# Patient Record
Sex: MALE | Race: WHITE | NOT HISPANIC OR LATINO | Employment: OTHER | URBAN - METROPOLITAN AREA
[De-identification: names, ages, dates, MRNs, and addresses within clinical notes are randomized per-mention and may not be internally consistent; named-entity substitution may affect disease eponyms.]

---

## 2023-03-14 ENCOUNTER — TELEMEDICINE (OUTPATIENT)
Dept: PRIMARY CARE | Facility: CLINIC | Age: 73
End: 2023-03-14
Payer: MEDICARE

## 2023-03-14 VITALS — BODY MASS INDEX: 32.07 KG/M2 | WEIGHT: 224 LBS | TEMPERATURE: 96.3 F | HEIGHT: 70 IN

## 2023-03-14 DIAGNOSIS — U07.1 ACUTE BRONCHITIS DUE TO COVID-19 VIRUS: Primary | ICD-10-CM

## 2023-03-14 DIAGNOSIS — E11.21 DIABETES MELLITUS WITH NEPHROPATHY (MULTI): ICD-10-CM

## 2023-03-14 DIAGNOSIS — J20.8 ACUTE BRONCHITIS DUE TO COVID-19 VIRUS: Primary | ICD-10-CM

## 2023-03-14 PROBLEM — E78.5 HYPERLIPIDEMIA ASSOCIATED WITH TYPE 2 DIABETES MELLITUS (MULTI): Status: ACTIVE | Noted: 2023-03-14

## 2023-03-14 PROBLEM — R79.89 HIGH THYROID STIMULATING HORMONE (TSH) LEVEL: Status: ACTIVE | Noted: 2023-03-14

## 2023-03-14 PROBLEM — E11.59 HYPERTENSION ASSOCIATED WITH DIABETES (MULTI): Status: ACTIVE | Noted: 2023-03-14

## 2023-03-14 PROBLEM — I15.2 HYPERTENSION ASSOCIATED WITH DIABETES (MULTI): Status: ACTIVE | Noted: 2023-03-14

## 2023-03-14 PROBLEM — E11.9 DIABETES MELLITUS, TYPE 2 (MULTI): Status: ACTIVE | Noted: 2023-03-14

## 2023-03-14 PROBLEM — E11.69 HYPERLIPIDEMIA ASSOCIATED WITH TYPE 2 DIABETES MELLITUS (MULTI): Status: ACTIVE | Noted: 2023-03-14

## 2023-03-14 PROBLEM — E66.9 OBESITY, UNSPECIFIED: Status: ACTIVE | Noted: 2023-03-14

## 2023-03-14 PROBLEM — K76.0 FATTY LIVER: Status: ACTIVE | Noted: 2023-03-14

## 2023-03-14 PROBLEM — E87.6 HYPOKALEMIA: Status: ACTIVE | Noted: 2023-03-14

## 2023-03-14 PROCEDURE — 99212 OFFICE O/P EST SF 10 MIN: CPT | Performed by: INTERNAL MEDICINE

## 2023-03-14 RX ORDER — LEVOTHYROXINE SODIUM 25 UG/1
TABLET ORAL
COMMUNITY
End: 2024-05-10 | Stop reason: SDUPTHER

## 2023-03-14 RX ORDER — PAROXETINE HYDROCHLORIDE 20 MG/1
1 TABLET, FILM COATED ORAL DAILY
COMMUNITY
Start: 2014-05-15 | End: 2023-09-05

## 2023-03-14 RX ORDER — ATORVASTATIN CALCIUM 80 MG/1
TABLET, FILM COATED ORAL
COMMUNITY
Start: 2016-04-15 | End: 2024-02-13 | Stop reason: SDUPTHER

## 2023-03-14 RX ORDER — EMPAGLIFLOZIN AND METFORMIN HYDROCHLORIDE 12.5; 1 MG/1; MG/1
1 TABLET ORAL DAILY
COMMUNITY
Start: 2023-01-16 | End: 2023-05-08 | Stop reason: SDUPTHER

## 2023-03-14 RX ORDER — HYDROCHLOROTHIAZIDE 25 MG/1
1 TABLET ORAL DAILY
COMMUNITY
Start: 2023-01-24 | End: 2023-04-20

## 2023-03-14 RX ORDER — AMLODIPINE BESYLATE 10 MG/1
1 TABLET ORAL DAILY
COMMUNITY
Start: 2014-04-07 | End: 2023-09-05

## 2023-03-14 RX ORDER — LOSARTAN POTASSIUM AND HYDROCHLOROTHIAZIDE 25; 100 MG/1; MG/1
1 TABLET ORAL
COMMUNITY
Start: 2023-02-26 | End: 2023-11-17

## 2023-03-14 RX ORDER — BLOOD-GLUCOSE METER
EACH MISCELLANEOUS
COMMUNITY
Start: 2021-10-13 | End: 2023-05-08 | Stop reason: ALTCHOICE

## 2023-03-14 RX ORDER — LANCETS
EACH MISCELLANEOUS
COMMUNITY
Start: 2022-11-08 | End: 2023-05-08 | Stop reason: ALTCHOICE

## 2023-03-14 RX ORDER — HYDROXYZINE HYDROCHLORIDE 25 MG/1
25 TABLET, FILM COATED ORAL EVERY EVENING
COMMUNITY
Start: 2023-02-13 | End: 2023-12-08 | Stop reason: ALTCHOICE

## 2023-03-14 RX ORDER — NIRMATRELVIR AND RITONAVIR 300-100 MG
KIT ORAL
Qty: 30 TABLET | Refills: 0 | Status: SHIPPED | OUTPATIENT
Start: 2023-03-14 | End: 2023-03-19

## 2023-03-14 ASSESSMENT — ENCOUNTER SYMPTOMS
FEVER: 0
WHEEZING: 0
NAUSEA: 0
PALPITATIONS: 0
SORE THROAT: 0
COUGH: 0
FATIGUE: 0
CHILLS: 0
VOMITING: 0
SHORTNESS OF BREATH: 0
CONSTIPATION: 0
DIARRHEA: 0

## 2023-03-14 NOTE — PROGRESS NOTES
Subjective   Patient ID: Mariano Jolley is a 72 y.o. male who presents for Establish Care (Positive covid this morning but his symptoms started last week /Loss of smell /But much better now ).    Assessment/Plan   Problem List Items Addressed This Visit          Respiratory    Acute bronchitis due to COVID-19 virus - Primary     Vitamin C vitamin D zinc melatonin check temperature oxygen at home continue hydration plus paxlovid side effect profile explained advised hold statin therapy          Continue hydration plus antiviral medication problem get worse does not get any better go to the emergency room right away  Source of history: Nurse, Medical personnel, Medical record, Patient.  History limitation: None.    HPI complaining of fever chills headache onset acutely duration 4 to 6 hours progressed acutely aggravating factor change of the weather immunocompromised host with a viral infection wife have similar problem then disease check test at home COVID-positive loss of taste or smell    Negative for high-grade fever    Negative for hypoxia    Negative for DVT PE    Negative for delirium or confusion    Comorbid conditions reviewed discussed with the patient regarding hypertension hyperlipidemia diabetes advised hold statin medication at least for 1 week watch blood sugar very closely  No Known Allergies    Current Outpatient Medications   Medication Sig Dispense Refill    amLODIPine (Norvasc) 10 mg tablet Take 1 tablet (10 mg) by mouth once daily.      atorvastatin (Lipitor) 80 mg tablet Take by mouth.      blood sugar diagnostic (OneTouch Verio test strips) strip pt tests once daily      hydroCHLOROthiazide (HYDRODiuril) 25 mg tablet Take 1 tablet (25 mg) by mouth once daily.      hydrOXYzine HCL (Atarax) 25 mg tablet Take 1 tablet (25 mg) by mouth once daily in the evening.      losartan-hydrochlorothiazide (Hyzaar) 100-25 mg tablet Take 1 tablet by mouth once daily in the morning. Take before meals.       "OneTouch UltraSoft Lancets misc use as directed      PARoxetine (Paxil) 20 mg tablet Take 1 tablet (20 mg) by mouth once daily.      Synjardy 12.5-1,000 mg Take 1 tablet by mouth once daily.      levothyroxine (Synthroid, Levoxyl) 25 mcg tablet TAKE 1/2 TABLET BY MOUTH IN THE MORNING       No current facility-administered medications for this visit.       Objective   Visit Vitals  Temp 35.7 °C (96.3 °F)   Ht 1.778 m (5' 10\")   Wt 102 kg (224 lb)   BMI 32.14 kg/m²   Smoking Status Never   BSA 2.24 m²     Physical Exam.Doxy  This visit was completed via video audio relation to  covid 19 pandemic all issues as below that discuss and address but no physical exam was performed if it was felt that patient should be evaluated in clinic and they have been advised to follow . Patient verbally consented to visit and spent  more than 50% discuss about patient's complaint of problem and plan      Review of Systems   Constitutional:  Negative for chills, fatigue and fever.   HENT:  Negative for congestion, ear pain and sore throat.    Respiratory:  Negative for cough, shortness of breath and wheezing.    Cardiovascular:  Negative for chest pain, palpitations and leg swelling.   Gastrointestinal:  Negative for constipation, diarrhea, nausea and vomiting.       Legacy Encounter on 01/09/2023   Component Date Value Ref Range Status    WBC 01/09/2023 9.7  4.4 - 11.3 x10E9/L Final    nRBC 01/09/2023 0.0  0.0 - 0.0 /100 WBC Final    RBC 01/09/2023 4.81  4.50 - 5.90 x10E12/L Final    Hemoglobin 01/09/2023 14.4  13.5 - 17.5 g/dL Final    Hematocrit 01/09/2023 41.9  41.0 - 52.0 % Final    MCV 01/09/2023 87  80 - 100 fL Final    MCHC 01/09/2023 34.4  32.0 - 36.0 g/dL Final    Platelets 01/09/2023 262  150 - 450 x10E9/L Final    RDW 01/09/2023 13.0  11.5 - 14.5 % Final    Neutrophils % 01/09/2023 66.0  40.0 - 80.0 % Final    Immature Granulocytes %, Automated 01/09/2023 0.5  0.0 - 0.9 % Final    Lymphocytes % 01/09/2023 23.9  13.0 - 44.0 % " Final    Monocytes % 01/09/2023 6.4  2.0 - 10.0 % Final    Eosinophils % 01/09/2023 2.6  0.0 - 6.0 % Final    Basophils % 01/09/2023 0.6  0.0 - 2.0 % Final    Neutrophils Absolute 01/09/2023 6.43 (H)  1.60 - 5.50 x10E9/L Final    Lymphocytes Absolute 01/09/2023 2.33  0.80 - 3.00 x10E9/L Final    Monocytes Absolute 01/09/2023 0.62  0.05 - 0.80 x10E9/L Final    Eosinophils Absolute 01/09/2023 0.25  0.00 - 0.40 x10E9/L Final    Basophils Absolute 01/09/2023 0.06  0.00 - 0.10 x10E9/L Final    ALBUMIN (MG/L) IN URINE 01/09/2023 48.1  Not Established mg/L Final    Albumin/Creatine Ratio 01/09/2023 20.6  0.0 - 30.0 ug/mg crt Final    Creatinine, Urine 01/09/2023 234.0  20.0 - 370.0 mg/dL Final    Color, Urine 01/09/2023 YELLOW  STRAW,YELLOW Final    Appearance, Urine 01/09/2023 HAZY  CLEAR Final    Specific Gravity, Urine 01/09/2023 1.015  1.005 - 1.035 Final    pH, Urine 01/09/2023 5.0  5.0 - 8.0 Final    Protein, Urine 01/09/2023 NEGATIVE  NEGATIVE mg/dL Final    Glucose, Urine 01/09/2023 NEGATIVE  NEGATIVE mg/dL Final    Blood, Urine 01/09/2023 NEGATIVE  NEGATIVE Final    Ketones, Urine 01/09/2023 NEGATIVE  NEGATIVE mg/dL Final    Bilirubin, Urine 01/09/2023 NEGATIVE  NEGATIVE Final    Urobilinogen, Urine 01/09/2023 <2.0  0.0 - 1.9 mg/dL Final    Nitrite, Urine 01/09/2023 NEGATIVE  NEGATIVE Final    Leukocyte Esterase, Urine 01/09/2023 NEGATIVE  NEGATIVE Final    TSH 01/09/2023 3.66  0.44 - 3.98 mIU/L Final    Glucose 01/09/2023 157 (H)  74 - 99 mg/dL Final    Sodium 01/09/2023 140  136 - 145 mmol/L Final    Potassium 01/09/2023 3.3 (L)  3.5 - 5.3 mmol/L Final    Chloride 01/09/2023 97 (L)  98 - 107 mmol/L Final    Bicarbonate 01/09/2023 30  21 - 32 mmol/L Final    Anion Gap 01/09/2023 16  10 - 20 mmol/L Final    Urea Nitrogen 01/09/2023 22  6 - 23 mg/dL Final    Creatinine 01/09/2023 1.37 (H)  0.50 - 1.30 mg/dL Final    GFR MALE 01/09/2023 55 (A)  >90 mL/min/1.73m2 Final    Calcium 01/09/2023 9.9  8.6 - 10.6 mg/dL  Final    Albumin 01/09/2023 4.8  3.4 - 5.0 g/dL Final    Alkaline Phosphatase 01/09/2023 57  33 - 136 U/L Final    Total Protein 01/09/2023 7.5  6.4 - 8.2 g/dL Final    AST 01/09/2023 33  9 - 39 U/L Final    Total Bilirubin 01/09/2023 0.9  0.0 - 1.2 mg/dL Final    ALT (SGPT) 01/09/2023 49  10 - 52 U/L Final    Cholesterol 01/09/2023 131  0 - 199 mg/dL Final    HDL 01/09/2023 35.4 (A)  mg/dL Final    Cholesterol/HDL Ratio 01/09/2023 3.7   Final    LDL 01/09/2023 51  0 - 99 mg/dL Final    VLDL 01/09/2023 45 (H)  0 - 40 mg/dL Final    Triglycerides 01/09/2023 223 (H)  0 - 149 mg/dL Final    Non HDL Cholesterol 01/09/2023 96  mg/dL Final    Hemoglobin A1C 01/09/2023 6.7 (A)  % Final    Estimated Average Glucose 01/09/2023 146  MG/DL Final    PSA 01/09/2023 0.19  0.00 - 4.00 ng/mL Final    Vitamin D, 25-Hydroxy 01/09/2023 35  ng/mL Final       Radiology: Reviewed imaging in powerchart.  No results found.    No family history on file.  Social History     Socioeconomic History    Marital status:      Spouse name: None    Number of children: None    Years of education: None    Highest education level: None   Occupational History    None   Tobacco Use    Smoking status: Never    Smokeless tobacco: Never   Vaping Use    Vaping status: None   Substance and Sexual Activity    Alcohol use: Never    Drug use: Never    Sexual activity: None   Other Topics Concern    None   Social History Narrative    None     Social Determinants of Health     Financial Resource Strain: Not on file   Food Insecurity: Not on file   Transportation Needs: Not on file   Physical Activity: Not on file   Stress: Not on file   Social Connections: Not on file   Intimate Partner Violence: Not on file   Housing Stability: Not on file     Past Medical History:   Diagnosis Date    Encounter for screening for malignant neoplasm of colon 11/17/2016    Screen for colon cancer    Encounter for screening for malignant neoplasm of rectum 11/17/2016     Screening for rectal cancer    Erectile dysfunction due to arterial insufficiency 12/01/2016    Erectile dysfunction due to arterial insufficiency    Essential (primary) hypertension 11/30/2021    Benign essential hypertension    Generalized anxiety disorder 04/04/2022    MARCIA (generalized anxiety disorder)    Impaired fasting glucose 09/27/2021    Fasting hyperglycemia    Obesity, unspecified 04/04/2022    Obesity (BMI 30.0-34.9)    Other hemorrhoids 11/23/2021    Internal hemorrhoid    Pain in right shoulder 10/07/2015    Right shoulder pain    Personal history of diseases of the blood and blood-forming organs and certain disorders involving the immune mechanism 11/17/2016    History of anemia    Personal history of diseases of the skin and subcutaneous tissue 09/16/2015    History of eczema    Personal history of diseases of the skin and subcutaneous tissue 09/16/2015    History of dermatitis    Personal history of diseases of the skin and subcutaneous tissue 04/26/2016    History of allergic contact dermatitis    Personal history of diseases of the skin and subcutaneous tissue     History of contact dermatitis    Personal history of other benign neoplasm 02/26/2021    History of other benign neoplasm    Personal history of other diseases of male genital organs 09/27/2021    History of benign prostatic hyperplasia    Personal history of other diseases of the circulatory system 12/29/2017    History of hypertension    Personal history of other diseases of the digestive system 03/04/2019    History of gastroesophageal reflux (GERD)    Personal history of other diseases of the digestive system 10/05/2020    History of hemorrhoids    Personal history of other diseases of the musculoskeletal system and connective tissue 07/14/2017    History of arthritis    Personal history of other diseases of the musculoskeletal system and connective tissue 03/04/2019    History of ganglion cyst    Personal history of other diseases  of the nervous system and sense organs 07/27/2020    History of otitis media    Personal history of other diseases of the respiratory system 04/24/2014    History of sinusitis    Personal history of other diseases of the respiratory system 11/30/2015    History of sore throat    Personal history of other diseases of the respiratory system 12/11/2014    History of bronchitis    Personal history of other diseases of urinary system 10/05/2020    History of hematuria    Personal history of other endocrine, nutritional and metabolic disease 11/23/2021    History of hyperlipidemia    Personal history of other mental and behavioral disorders 03/04/2019    History of anxiety    Personal history of other mental and behavioral disorders 10/01/2018    History of depression    Personal history of pneumonia (recurrent) 03/04/2019    History of pneumonia    Personal history of pneumonia (recurrent) 12/29/2017    History of community acquired pneumonia    Personal history of urinary (tract) infections 01/18/2022    History of urinary tract infection    Rash and other nonspecific skin eruption 04/04/2022    Skin rash     History reviewed. No pertinent surgical history.    Charting was completed using voice recognition technology and may include unintended errors.

## 2023-03-14 NOTE — ASSESSMENT & PLAN NOTE
Monitor blood sugar because of viral infection and medication increase hydration to prevent ketoacidosis

## 2023-03-14 NOTE — ASSESSMENT & PLAN NOTE
Vitamin C vitamin D zinc melatonin check temperature oxygen at home continue hydration plus paxlovid side effect profile explained advised hold statin therapy

## 2023-03-16 NOTE — ADDENDUM NOTE
Addended by: MARY MENDEZ on: 3/14/2023 03:07 PM     Modules accepted: Orders     Finasteride Male Counseling: Finasteride Counseling:  I discussed with the patient the risks of use of finasteride including but not limited to decreased libido, decreased ejaculate volume, gynecomastia, and depression. Women should not handle medication.  All of the patient's questions and concerns were addressed. Finasteride Counseling:  I discussed with the patient the risks of use of finasteride including but not limited to decreased libido, decreased ejaculate volume, gynecomastia, and depression. Women should not handle medication.  All of the patient's questions and concerns were addressed.

## 2023-04-11 ENCOUNTER — TELEMEDICINE (OUTPATIENT)
Dept: PRIMARY CARE | Facility: CLINIC | Age: 73
End: 2023-04-11
Payer: MEDICARE

## 2023-04-11 VITALS — WEIGHT: 224 LBS | HEIGHT: 70 IN | TEMPERATURE: 97.5 F | BODY MASS INDEX: 32.07 KG/M2

## 2023-04-11 DIAGNOSIS — J20.8 ACUTE BRONCHITIS DUE TO COVID-19 VIRUS: Primary | ICD-10-CM

## 2023-04-11 DIAGNOSIS — E11.618 TYPE 2 DIABETES MELLITUS WITH OTHER DIABETIC ARTHROPATHY, WITHOUT LONG-TERM CURRENT USE OF INSULIN (MULTI): ICD-10-CM

## 2023-04-11 DIAGNOSIS — U07.1 ACUTE BRONCHITIS DUE TO COVID-19 VIRUS: Primary | ICD-10-CM

## 2023-04-11 PROCEDURE — 1159F MED LIST DOCD IN RCRD: CPT | Performed by: INTERNAL MEDICINE

## 2023-04-11 PROCEDURE — 1036F TOBACCO NON-USER: CPT | Performed by: INTERNAL MEDICINE

## 2023-04-11 PROCEDURE — 99212 OFFICE O/P EST SF 10 MIN: CPT | Performed by: INTERNAL MEDICINE

## 2023-04-11 PROCEDURE — 1157F ADVNC CARE PLAN IN RCRD: CPT | Performed by: INTERNAL MEDICINE

## 2023-04-11 RX ORDER — CODEINE PHOSPHATE AND GUAIFENESIN 10; 100 MG/5ML; MG/5ML
10 SOLUTION ORAL EVERY 6 HOURS PRN
Qty: 120 ML | Refills: 0 | Status: SHIPPED | OUTPATIENT
Start: 2023-04-11 | End: 2023-04-16

## 2023-04-11 RX ORDER — AZITHROMYCIN 250 MG/1
TABLET, FILM COATED ORAL
Qty: 6 TABLET | Refills: 0 | Status: SHIPPED | OUTPATIENT
Start: 2023-04-11 | End: 2023-04-16

## 2023-04-11 NOTE — PROGRESS NOTES
Patient ID: Mariano Jolley is a 72 y.o. male who presents for Cough (4 days now /Congestion ).    Assessment/Plan     Problem List Items Addressed This Visit          Respiratory    Acute bronchitis due to COVID-19 virus - Primary     Vitamin C vitamin D zinc melatonin Zithromax Robitussin-DM cough syrup 50 normal twice a day            Endocrine/Metabolic    Diabetes mellitus, type 2 (CMS/HCC)     Diabetes is chronic disease, it does not get cured but it can be controlled, in modern medicine there are variety of measures taken to control DM. Usually we want to preserve beta cell functions. Please understand the disease and how our life style can affect control of glycemia. Diabetes leads to macro and microvascular complications and they could be devastating. It is important to have annual eye check and frequent foot inspection and foot inspection. Kidney dysfunction including dialysis, foot amputations, neuropathy, foot ulcers and accelerated atherosclerotic vascular disease are known complications of uncontrolled DM, pt was educated and explained.              Source of history: Nurse, Medical personnel, Medical record, Patient.  History limitation: None.      HPI  72-year-old patient have diabetes with complication hypertension hyperlipidemia proteinuria hypothyroidism anxiety depression complaining the cough congestion shortness of breath low-grade fever chills onset gradually duration few weeks progressed slowly aggravating factor immunocompromised host allergy infection allergy postnasal drip pharyngitis mild bronchitis given vitamin C vitamin D zinc melatonin Zithromax and Robitussin-DM if no better go to the emergency room watch oxygen temperature blood sugar and blood pressure  No Known Allergies    Medications    Current Outpatient Medications   Medication Sig Dispense Refill    amLODIPine (Norvasc) 10 mg tablet Take 1 tablet (10 mg) by mouth once daily.      atorvastatin (Lipitor) 80 mg tablet Take by  "mouth.      blood sugar diagnostic (OneTouch Verio test strips) strip pt tests once daily      hydroCHLOROthiazide (HYDRODiuril) 25 mg tablet Take 1 tablet (25 mg) by mouth once daily.      hydrOXYzine HCL (Atarax) 25 mg tablet Take 1 tablet (25 mg) by mouth once daily in the evening.      levothyroxine (Synthroid, Levoxyl) 25 mcg tablet TAKE 1/2 TABLET BY MOUTH IN THE MORNING      losartan-hydrochlorothiazide (Hyzaar) 100-25 mg tablet Take 1 tablet by mouth once daily in the morning. Take before meals.      OneTouch UltraSoft Lancets misc use as directed      PARoxetine (Paxil) 20 mg tablet Take 1 tablet (20 mg) by mouth once daily.      Synjardy 12.5-1,000 mg Take 1 tablet by mouth once daily.       No current facility-administered medications for this visit.       Objective   Visit Vitals  Temp 36.4 °C (97.5 °F)   Ht 1.778 m (5' 10\")   Wt 102 kg (224 lb)   BMI 32.14 kg/m²   Smoking Status Never   BSA 2.24 m²       .Doxy  This visit was completed via video audio relation to  covid 19 pandemic all issues as below that discuss and address but no physical exam was performed if it was felt that patient should be evaluated in clinic and they have been advised to follow . Patient verbally consented to visit and spent  more than 50% discuss about patient's complaint of problem and plan      ROS  Constitutional: Denies fevers, chills, fatigue, weight loss/gain  HEENT: Sore throat cardiac: Denies CP, palpitations, edema  Respiratory: Cough congestion phlegm GI: Denies N/V/D, abd pain, constipation, black/bloody stools  : Denies urinary changes, frequency, hematuria, urgency, retention, flank pain  MSK: Denies joint pain, joint swelling, back pain, neck pain, extremity pain  Neuro: Insomnia headache body ache  Immunization History   Administered Date(s) Administered    Influenza, seasonal, injectable 10/10/2022    Pfizer Purple Cap SARS-CoV-2 02/27/2021, 03/27/2021, 10/11/2021    Pfizer Sars-cov-2 Bivalent 30 mcg/0.3 mL " 10/18/2022    Pneumococcal Conjugate PCV 13 07/14/2017    Pneumococcal Polysaccharide PPSV23 09/30/2019    SARS-CoV-2, Unspecified 04/05/2022    Zoster, Recombinant 06/29/2019       Legacy Encounter on 01/09/2023   Component Date Value Ref Range Status    WBC 01/09/2023 9.7  4.4 - 11.3 x10E9/L Final    nRBC 01/09/2023 0.0  0.0 - 0.0 /100 WBC Final    RBC 01/09/2023 4.81  4.50 - 5.90 x10E12/L Final    Hemoglobin 01/09/2023 14.4  13.5 - 17.5 g/dL Final    Hematocrit 01/09/2023 41.9  41.0 - 52.0 % Final    MCV 01/09/2023 87  80 - 100 fL Final    MCHC 01/09/2023 34.4  32.0 - 36.0 g/dL Final    Platelets 01/09/2023 262  150 - 450 x10E9/L Final    RDW 01/09/2023 13.0  11.5 - 14.5 % Final    Neutrophils % 01/09/2023 66.0  40.0 - 80.0 % Final    Immature Granulocytes %, Automated 01/09/2023 0.5  0.0 - 0.9 % Final    Lymphocytes % 01/09/2023 23.9  13.0 - 44.0 % Final    Monocytes % 01/09/2023 6.4  2.0 - 10.0 % Final    Eosinophils % 01/09/2023 2.6  0.0 - 6.0 % Final    Basophils % 01/09/2023 0.6  0.0 - 2.0 % Final    Neutrophils Absolute 01/09/2023 6.43 (H)  1.60 - 5.50 x10E9/L Final    Lymphocytes Absolute 01/09/2023 2.33  0.80 - 3.00 x10E9/L Final    Monocytes Absolute 01/09/2023 0.62  0.05 - 0.80 x10E9/L Final    Eosinophils Absolute 01/09/2023 0.25  0.00 - 0.40 x10E9/L Final    Basophils Absolute 01/09/2023 0.06  0.00 - 0.10 x10E9/L Final    ALBUMIN (MG/L) IN URINE 01/09/2023 48.1  Not Established mg/L Final    Albumin/Creatine Ratio 01/09/2023 20.6  0.0 - 30.0 ug/mg crt Final    Creatinine, Urine 01/09/2023 234.0  20.0 - 370.0 mg/dL Final    Color, Urine 01/09/2023 YELLOW  STRAW,YELLOW Final    Appearance, Urine 01/09/2023 HAZY  CLEAR Final    Specific Gravity, Urine 01/09/2023 1.015  1.005 - 1.035 Final    pH, Urine 01/09/2023 5.0  5.0 - 8.0 Final    Protein, Urine 01/09/2023 NEGATIVE  NEGATIVE mg/dL Final    Glucose, Urine 01/09/2023 NEGATIVE  NEGATIVE mg/dL Final    Blood, Urine 01/09/2023 NEGATIVE  NEGATIVE Final     Ketones, Urine 01/09/2023 NEGATIVE  NEGATIVE mg/dL Final    Bilirubin, Urine 01/09/2023 NEGATIVE  NEGATIVE Final    Urobilinogen, Urine 01/09/2023 <2.0  0.0 - 1.9 mg/dL Final    Nitrite, Urine 01/09/2023 NEGATIVE  NEGATIVE Final    Leukocyte Esterase, Urine 01/09/2023 NEGATIVE  NEGATIVE Final    TSH 01/09/2023 3.66  0.44 - 3.98 mIU/L Final    Glucose 01/09/2023 157 (H)  74 - 99 mg/dL Final    Sodium 01/09/2023 140  136 - 145 mmol/L Final    Potassium 01/09/2023 3.3 (L)  3.5 - 5.3 mmol/L Final    Chloride 01/09/2023 97 (L)  98 - 107 mmol/L Final    Bicarbonate 01/09/2023 30  21 - 32 mmol/L Final    Anion Gap 01/09/2023 16  10 - 20 mmol/L Final    Urea Nitrogen 01/09/2023 22  6 - 23 mg/dL Final    Creatinine 01/09/2023 1.37 (H)  0.50 - 1.30 mg/dL Final    GFR MALE 01/09/2023 55 (A)  >90 mL/min/1.73m2 Final    Calcium 01/09/2023 9.9  8.6 - 10.6 mg/dL Final    Albumin 01/09/2023 4.8  3.4 - 5.0 g/dL Final    Alkaline Phosphatase 01/09/2023 57  33 - 136 U/L Final    Total Protein 01/09/2023 7.5  6.4 - 8.2 g/dL Final    AST 01/09/2023 33  9 - 39 U/L Final    Total Bilirubin 01/09/2023 0.9  0.0 - 1.2 mg/dL Final    ALT (SGPT) 01/09/2023 49  10 - 52 U/L Final    Cholesterol 01/09/2023 131  0 - 199 mg/dL Final    HDL 01/09/2023 35.4 (A)  mg/dL Final    Cholesterol/HDL Ratio 01/09/2023 3.7   Final    LDL 01/09/2023 51  0 - 99 mg/dL Final    VLDL 01/09/2023 45 (H)  0 - 40 mg/dL Final    Triglycerides 01/09/2023 223 (H)  0 - 149 mg/dL Final    Non HDL Cholesterol 01/09/2023 96  mg/dL Final    Hemoglobin A1C 01/09/2023 6.7 (A)  % Final    Estimated Average Glucose 01/09/2023 146  MG/DL Final    PSA 01/09/2023 0.19  0.00 - 4.00 ng/mL Final    Vitamin D, 25-Hydroxy 01/09/2023 35  ng/mL Final       Radiology: Reviewed imaging in powerchart.  No results found.    No family history on file.  Social History     Socioeconomic History    Marital status:      Spouse name: None    Number of children: None    Years of education: None     Highest education level: None   Occupational History    None   Tobacco Use    Smoking status: Never    Smokeless tobacco: Never   Vaping Use    Vaping status: None   Substance and Sexual Activity    Alcohol use: Never    Drug use: Never    Sexual activity: None   Other Topics Concern    None   Social History Narrative    None     Social Determinants of Health     Financial Resource Strain: Not on file   Food Insecurity: Not on file   Transportation Needs: Not on file   Physical Activity: Not on file   Stress: Not on file   Social Connections: Not on file   Intimate Partner Violence: Not on file   Housing Stability: Not on file     Past Medical History:   Diagnosis Date    Encounter for screening for malignant neoplasm of colon 11/17/2016    Screen for colon cancer    Encounter for screening for malignant neoplasm of rectum 11/17/2016    Screening for rectal cancer    Erectile dysfunction due to arterial insufficiency 12/01/2016    Erectile dysfunction due to arterial insufficiency    Essential (primary) hypertension 11/30/2021    Benign essential hypertension    Generalized anxiety disorder 04/04/2022    MARCIA (generalized anxiety disorder)    Impaired fasting glucose 09/27/2021    Fasting hyperglycemia    Obesity, unspecified 04/04/2022    Obesity (BMI 30.0-34.9)    Other hemorrhoids 11/23/2021    Internal hemorrhoid    Pain in right shoulder 10/07/2015    Right shoulder pain    Personal history of diseases of the blood and blood-forming organs and certain disorders involving the immune mechanism 11/17/2016    History of anemia    Personal history of diseases of the skin and subcutaneous tissue 09/16/2015    History of eczema    Personal history of diseases of the skin and subcutaneous tissue 09/16/2015    History of dermatitis    Personal history of diseases of the skin and subcutaneous tissue 04/26/2016    History of allergic contact dermatitis    Personal history of diseases of the skin and subcutaneous tissue      History of contact dermatitis    Personal history of other benign neoplasm 02/26/2021    History of other benign neoplasm    Personal history of other diseases of male genital organs 09/27/2021    History of benign prostatic hyperplasia    Personal history of other diseases of the circulatory system 12/29/2017    History of hypertension    Personal history of other diseases of the digestive system 03/04/2019    History of gastroesophageal reflux (GERD)    Personal history of other diseases of the digestive system 10/05/2020    History of hemorrhoids    Personal history of other diseases of the musculoskeletal system and connective tissue 07/14/2017    History of arthritis    Personal history of other diseases of the musculoskeletal system and connective tissue 03/04/2019    History of ganglion cyst    Personal history of other diseases of the nervous system and sense organs 07/27/2020    History of otitis media    Personal history of other diseases of the respiratory system 04/24/2014    History of sinusitis    Personal history of other diseases of the respiratory system 11/30/2015    History of sore throat    Personal history of other diseases of the respiratory system 12/11/2014    History of bronchitis    Personal history of other diseases of urinary system 10/05/2020    History of hematuria    Personal history of other endocrine, nutritional and metabolic disease 11/23/2021    History of hyperlipidemia    Personal history of other mental and behavioral disorders 03/04/2019    History of anxiety    Personal history of other mental and behavioral disorders 10/01/2018    History of depression    Personal history of pneumonia (recurrent) 03/04/2019    History of pneumonia    Personal history of pneumonia (recurrent) 12/29/2017    History of community acquired pneumonia    Personal history of urinary (tract) infections 01/18/2022    History of urinary tract infection    Rash and other nonspecific skin eruption  04/04/2022    Skin rash     History reviewed. No pertinent surgical history.  * Cannot find OR log *    Charting was completed using voice recognition technology and may include unintended errors.

## 2023-04-20 DIAGNOSIS — I15.2 HYPERTENSION SECONDARY TO ENDOCRINE DISORDERS: ICD-10-CM

## 2023-04-20 DIAGNOSIS — E11.59 TYPE 2 DIABETES MELLITUS WITH OTHER CIRCULATORY COMPLICATIONS (MULTI): ICD-10-CM

## 2023-04-20 RX ORDER — HYDROCHLOROTHIAZIDE 25 MG/1
TABLET ORAL
Qty: 90 TABLET | Refills: 3 | Status: SHIPPED | OUTPATIENT
Start: 2023-04-20 | End: 2023-05-08 | Stop reason: ALTCHOICE

## 2023-05-08 ENCOUNTER — OFFICE VISIT (OUTPATIENT)
Dept: PRIMARY CARE | Facility: CLINIC | Age: 73
End: 2023-05-08
Payer: MEDICARE

## 2023-05-08 ENCOUNTER — LAB (OUTPATIENT)
Dept: LAB | Facility: LAB | Age: 73
End: 2023-05-08
Payer: MEDICARE

## 2023-05-08 VITALS
HEIGHT: 70 IN | HEART RATE: 68 BPM | DIASTOLIC BLOOD PRESSURE: 66 MMHG | TEMPERATURE: 97.3 F | OXYGEN SATURATION: 97 % | SYSTOLIC BLOOD PRESSURE: 106 MMHG | BODY MASS INDEX: 31.9 KG/M2 | WEIGHT: 222.8 LBS

## 2023-05-08 DIAGNOSIS — E83.42 HYPOMAGNESEMIA: ICD-10-CM

## 2023-05-08 DIAGNOSIS — E11.21 DIABETES MELLITUS WITH NEPHROPATHY (MULTI): ICD-10-CM

## 2023-05-08 DIAGNOSIS — N18.31 STAGE 3A CHRONIC KIDNEY DISEASE (MULTI): ICD-10-CM

## 2023-05-08 DIAGNOSIS — E78.5 HYPERLIPIDEMIA ASSOCIATED WITH TYPE 2 DIABETES MELLITUS (MULTI): ICD-10-CM

## 2023-05-08 DIAGNOSIS — I15.2 HYPERTENSION ASSOCIATED WITH DIABETES (MULTI): ICD-10-CM

## 2023-05-08 DIAGNOSIS — E11.618 TYPE 2 DIABETES MELLITUS WITH OTHER DIABETIC ARTHROPATHY, WITHOUT LONG-TERM CURRENT USE OF INSULIN (MULTI): Primary | ICD-10-CM

## 2023-05-08 DIAGNOSIS — E11.69 HYPERLIPIDEMIA ASSOCIATED WITH TYPE 2 DIABETES MELLITUS (MULTI): ICD-10-CM

## 2023-05-08 DIAGNOSIS — E11.59 HYPERTENSION ASSOCIATED WITH DIABETES (MULTI): ICD-10-CM

## 2023-05-08 DIAGNOSIS — E11.618 TYPE 2 DIABETES MELLITUS WITH OTHER DIABETIC ARTHROPATHY, WITHOUT LONG-TERM CURRENT USE OF INSULIN (MULTI): ICD-10-CM

## 2023-05-08 DIAGNOSIS — E66.09 CLASS 1 OBESITY DUE TO EXCESS CALORIES WITHOUT SERIOUS COMORBIDITY WITH BODY MASS INDEX (BMI) OF 31.0 TO 31.9 IN ADULT: ICD-10-CM

## 2023-05-08 PROBLEM — J20.8 ACUTE BRONCHITIS DUE TO COVID-19 VIRUS: Status: RESOLVED | Noted: 2023-03-14 | Resolved: 2023-05-08

## 2023-05-08 PROBLEM — E11.9 DIABETES MELLITUS, TYPE 2 (MULTI): Status: RESOLVED | Noted: 2023-03-14 | Resolved: 2023-05-08

## 2023-05-08 PROBLEM — K63.5 POLYP OF COLON: Status: RESOLVED | Noted: 2023-05-08 | Resolved: 2023-05-08

## 2023-05-08 PROBLEM — K76.0 FATTY LIVER: Status: RESOLVED | Noted: 2023-03-14 | Resolved: 2023-05-08

## 2023-05-08 PROBLEM — F41.9 ANXIETY: Status: ACTIVE | Noted: 2023-05-08

## 2023-05-08 PROBLEM — U07.1 ACUTE BRONCHITIS DUE TO COVID-19 VIRUS: Status: RESOLVED | Noted: 2023-03-14 | Resolved: 2023-05-08

## 2023-05-08 PROBLEM — K63.5 POLYP OF COLON: Status: ACTIVE | Noted: 2023-05-08

## 2023-05-08 LAB
ALANINE AMINOTRANSFERASE (SGPT) (U/L) IN SER/PLAS: 33 U/L (ref 10–52)
ALBUMIN (G/DL) IN SER/PLAS: 5 G/DL (ref 3.4–5)
ALBUMIN (MG/L) IN URINE: 15.9 MG/L
ALBUMIN/CREATININE (UG/MG) IN URINE: 9.2 UG/MG CRT (ref 0–30)
ALKALINE PHOSPHATASE (U/L) IN SER/PLAS: 47 U/L (ref 33–136)
ANION GAP IN SER/PLAS: 14 MMOL/L (ref 10–20)
ASPARTATE AMINOTRANSFERASE (SGOT) (U/L) IN SER/PLAS: 30 U/L (ref 9–39)
BILIRUBIN TOTAL (MG/DL) IN SER/PLAS: 0.9 MG/DL (ref 0–1.2)
CALCIUM (MG/DL) IN SER/PLAS: 10.1 MG/DL (ref 8.6–10.6)
CARBON DIOXIDE, TOTAL (MMOL/L) IN SER/PLAS: 31 MMOL/L (ref 21–32)
CHLORIDE (MMOL/L) IN SER/PLAS: 101 MMOL/L (ref 98–107)
CREATININE (MG/DL) IN SER/PLAS: 1.14 MG/DL (ref 0.5–1.3)
CREATININE (MG/DL) IN URINE: 172 MG/DL (ref 20–370)
ESTIMATED AVERAGE GLUCOSE FOR HBA1C: 128 MG/DL
GFR MALE: 68 ML/MIN/1.73M2
GLUCOSE (MG/DL) IN SER/PLAS: 134 MG/DL (ref 74–99)
HEMOGLOBIN A1C/HEMOGLOBIN TOTAL IN BLOOD: 6.1 %
MAGNESIUM (MG/DL) IN SER/PLAS: 2.1 MG/DL (ref 1.6–2.4)
POTASSIUM (MMOL/L) IN SER/PLAS: 3.6 MMOL/L (ref 3.5–5.3)
PROTEIN TOTAL: 7.6 G/DL (ref 6.4–8.2)
SODIUM (MMOL/L) IN SER/PLAS: 142 MMOL/L (ref 136–145)
UREA NITROGEN (MG/DL) IN SER/PLAS: 15 MG/DL (ref 6–23)

## 2023-05-08 PROCEDURE — 3044F HG A1C LEVEL LT 7.0%: CPT | Performed by: INTERNAL MEDICINE

## 2023-05-08 PROCEDURE — 83735 ASSAY OF MAGNESIUM: CPT

## 2023-05-08 PROCEDURE — 36415 COLL VENOUS BLD VENIPUNCTURE: CPT

## 2023-05-08 PROCEDURE — 99214 OFFICE O/P EST MOD 30 MIN: CPT | Performed by: INTERNAL MEDICINE

## 2023-05-08 PROCEDURE — 3078F DIAST BP <80 MM HG: CPT | Performed by: INTERNAL MEDICINE

## 2023-05-08 PROCEDURE — 1159F MED LIST DOCD IN RCRD: CPT | Performed by: INTERNAL MEDICINE

## 2023-05-08 PROCEDURE — 82043 UR ALBUMIN QUANTITATIVE: CPT

## 2023-05-08 PROCEDURE — 1160F RVW MEDS BY RX/DR IN RCRD: CPT | Performed by: INTERNAL MEDICINE

## 2023-05-08 PROCEDURE — 80053 COMPREHEN METABOLIC PANEL: CPT

## 2023-05-08 PROCEDURE — 1036F TOBACCO NON-USER: CPT | Performed by: INTERNAL MEDICINE

## 2023-05-08 PROCEDURE — 3008F BODY MASS INDEX DOCD: CPT | Performed by: INTERNAL MEDICINE

## 2023-05-08 PROCEDURE — 82570 ASSAY OF URINE CREATININE: CPT

## 2023-05-08 PROCEDURE — 3074F SYST BP LT 130 MM HG: CPT | Performed by: INTERNAL MEDICINE

## 2023-05-08 PROCEDURE — 1157F ADVNC CARE PLAN IN RCRD: CPT | Performed by: INTERNAL MEDICINE

## 2023-05-08 PROCEDURE — 83036 HEMOGLOBIN GLYCOSYLATED A1C: CPT

## 2023-05-08 RX ORDER — EMPAGLIFLOZIN AND METFORMIN HYDROCHLORIDE 12.5; 1 MG/1; MG/1
1 TABLET ORAL DAILY
Qty: 90 TABLET | Refills: 3 | Status: SHIPPED | OUTPATIENT
Start: 2023-05-08 | End: 2024-02-08

## 2023-05-08 NOTE — PROGRESS NOTES
Patient ID: Mariano Jolley is a 72 y.o. male who presents for Follow-up.    Assessment/Plan     Problem List Items Addressed This Visit          Circulatory    Hypertension associated with diabetes (CMS/HCC)     Patients BP readings reviewed and addressed, as we age our arteries turn stiffer and less elastic. Restricting salt consumption and staying physically fit with regular exercise regimen is the only way to keep our vasculature less tonic. Studies have shown that keeping ideal body wt, exercise routine about 140 to 150 minutes a week, eating variety of plant based diet and drinking plentiful water are quite helpful. Monitor BP twice or once a week at home and bring log to be reviewed by me. Uncontrolled BP has long term consequences including heart failure, myocardial infarction, accelerated atherosclerosis and kidney dysfunction. Therapy reviewed and explained.              Endocrine/Metabolic    Diabetes mellitus with nephropathy (CMS/HCC)     Diabetes is chronic disease, it does not get cured but it can be controlled, in modern medicine there are variety of measures taken to control DM. Usually we want to preserve beta cell functions. Please understand the disease and how our life style can affect control of glycemia. Diabetes leads to macro and microvascular complications and they could be devastating. It is important to have annual eye check and frequent foot inspection and foot inspection. Kidney dysfunction including dialysis, foot amputations, neuropathy, foot ulcers and accelerated atherosclerotic vascular disease are known complications of uncontrolled DM, pt was educated and explained.          Hyperlipidemia associated with type 2 diabetes mellitus (CMS/HCC)    Obesity, unspecified     I spent <15 minutes face to face with individual providing recommendations for nutrition choices and exercise plan to help achieve weight reduction goals. Obesity is systemic disorder and it can bring devastating  morbidities in furture. It is a matter of calorie gain and loss, keeping bodybank in negative calorie balance mode is the way to sustain weight loss.Diet has a big role in reducing excess body wt. Scheduled and well planned meals and food intake with watchfulness and understanding of calorie portion and distribution is key to understand. Bariatric surgery is another option if sustained wt loss is not achieved and faced with one or more comorbidities with morbid obesity. Weigh yourself twice a week to understand and follow wt loss goals.           RESOLVED: Diabetes mellitus, type 2 (CMS/[facility]) - Primary    Relevant Medications    empagliflozin-metformin (Synjardy) 12.5-1,000 mg    Other Relevant Orders    Albumin , Urine Random    Comprehensive Metabolic Panel    Hemoglobin A1C    Magnesium     Other Visit Diagnoses       Hypomagnesemia        Relevant Orders    Magnesium          Patient was evaluated today, problem list was reviewed, problems and concerns addressed, Rx list reviewed and updated, lab and tests were noted and reviewed. Life style changes were discussed, always it works better if we eat plant based diet and plenty of fibres and roughage. Consume adequate amount of water and avoid alcohol, light to moderate physical activities and stress reduction are always beneficial for ongoing physical well being. Do not forget to have 6 to 7 hours of sleep regularly and avoid late night ilya screen exposure.   Source of history: Nurse, Medical personnel, Medical record, Patient.  History limitation: None.      HPI  78-year-old patient who diabetes with complication retinopathy neuropathy nephropathy associated with hypertension hyperlipidemia hypothyroidism obesity anxiety depression complaining arthralgia myalgia fatigue tired tingling numbness lower extremity    Negative for fall    Negative for suicide    Negative for hypoxia hypoglycemia or COVID-19  No Known Allergies    Medications    Current Outpatient  "Medications   Medication Sig Dispense Refill    amLODIPine (Norvasc) 10 mg tablet Take 1 tablet (10 mg) by mouth once daily.      atorvastatin (Lipitor) 80 mg tablet Take by mouth.      hydrOXYzine HCL (Atarax) 25 mg tablet Take 1 tablet (25 mg) by mouth once daily in the evening.      levothyroxine (Synthroid, Levoxyl) 25 mcg tablet TAKE 1/2 TABLET BY MOUTH IN THE MORNING      losartan-hydrochlorothiazide (Hyzaar) 100-25 mg tablet Take 1 tablet by mouth once daily in the morning. Take before meals.      PARoxetine (Paxil) 20 mg tablet Take 1 tablet (20 mg) by mouth once daily.      empagliflozin-metformin (Synjardy) 12.5-1,000 mg Take 1 tablet by mouth once daily. 90 tablet 3     No current facility-administered medications for this visit.       Objective   Visit Vitals  /66 (BP Location: Right arm, Patient Position: Sitting, BP Cuff Size: Large adult)   Pulse 68   Temp 36.3 °C (97.3 °F)   Ht 1.778 m (5' 10\")   Wt 101 kg (222 lb 12.8 oz)   SpO2 97%   BMI 31.97 kg/m²   Smoking Status Never   BSA 2.23 m²       PHYSICAL EXAM  General: NAD. NCAT. Aox3 obesity  HEENT: PERRLA. EOMI. MMM. Nares patent bl.  Cardiovascular: RRR. No MRG. S1/S2 wnl.  Heart murmur  Respiratory: CTABL. No acute respiratory distress.   GI: Soft, NT abdomen. BS present x 4.   : No CVAT BL  MSK: ROM x 4. CTLS non-tender.  Arthritis  Extremities: No edema. Cap refill < 2 sec.   Skin: No rashes or bruises.   Neuro: Aox3. Cranial Nerves grossly intact. Motor/sensory wnl.   Psych: Mood wnl.      Physical Exam  Cardiovascular:      Pulses:           Dorsalis pedis pulses are 2+ on the right side and 2+ on the left side.        Posterior tibial pulses are 2+ on the right side and 2+ on the left side.   Musculoskeletal:      Right foot: No deformity.      Left foot: No deformity.   Feet:      Right foot:      Protective Sensation: 5 sites tested.        Skin integrity: Skin integrity normal.      Toenail Condition: Right toenails are normal.      " Left foot:      Protective Sensation: 5 sites tested.        Skin integrity: Skin integrity normal.      Toenail Condition: Left toenails are normal.         ROS  Constitutional: Denies fevers, chills, fatigue, weight loss/gain  HEENT: Denies HA, vision changes, hearing loss, sore throat  Cardiac: Denies CP, palpitations, edema  Respiratory: Denies SOB, cough, pleuritic chest pain, PND, orthopnea  GI: Denies N/V/D, abd pain, constipation, black/bloody stools  : Denies urinary changes, frequency, hematuria, urgency, retention, flank pain  MSK: Denies joint pain, joint swelling, back pain, neck pain, extremity pain  Neuro: Denies numbness, weakness, tingling    Immunization History   Administered Date(s) Administered    Influenza, seasonal, injectable 10/26/2016, 10/10/2022    Influenza, seasonal, injectable, preservative free 09/16/2015    Pfizer Gray Cap SARS-CoV-2 04/05/2022    Pfizer Purple Cap SARS-CoV-2 02/27/2021, 03/27/2021, 10/11/2021    Pfizer Sars-cov-2 Bivalent 30 mcg/0.3 mL 10/18/2022    Pneumococcal Conjugate PCV 13 07/14/2017    Pneumococcal Polysaccharide PPSV23 09/30/2019    SARS-CoV-2, Unspecified 04/05/2022    Zoster, Recombinant 06/29/2019       Legacy Encounter on 01/09/2023   Component Date Value Ref Range Status    WBC 01/09/2023 9.7  4.4 - 11.3 x10E9/L Final    nRBC 01/09/2023 0.0  0.0 - 0.0 /100 WBC Final    RBC 01/09/2023 4.81  4.50 - 5.90 x10E12/L Final    Hemoglobin 01/09/2023 14.4  13.5 - 17.5 g/dL Final    Hematocrit 01/09/2023 41.9  41.0 - 52.0 % Final    MCV 01/09/2023 87  80 - 100 fL Final    MCHC 01/09/2023 34.4  32.0 - 36.0 g/dL Final    Platelets 01/09/2023 262  150 - 450 x10E9/L Final    RDW 01/09/2023 13.0  11.5 - 14.5 % Final    Neutrophils % 01/09/2023 66.0  40.0 - 80.0 % Final    Immature Granulocytes %, Automated 01/09/2023 0.5  0.0 - 0.9 % Final    Lymphocytes % 01/09/2023 23.9  13.0 - 44.0 % Final    Monocytes % 01/09/2023 6.4  2.0 - 10.0 % Final    Eosinophils %  01/09/2023 2.6  0.0 - 6.0 % Final    Basophils % 01/09/2023 0.6  0.0 - 2.0 % Final    Neutrophils Absolute 01/09/2023 6.43 (H)  1.60 - 5.50 x10E9/L Final    Lymphocytes Absolute 01/09/2023 2.33  0.80 - 3.00 x10E9/L Final    Monocytes Absolute 01/09/2023 0.62  0.05 - 0.80 x10E9/L Final    Eosinophils Absolute 01/09/2023 0.25  0.00 - 0.40 x10E9/L Final    Basophils Absolute 01/09/2023 0.06  0.00 - 0.10 x10E9/L Final    ALBUMIN (MG/L) IN URINE 01/09/2023 48.1  Not Established mg/L Final    Albumin/Creatine Ratio 01/09/2023 20.6  0.0 - 30.0 ug/mg crt Final    Creatinine, Urine 01/09/2023 234.0  20.0 - 370.0 mg/dL Final    Color, Urine 01/09/2023 YELLOW  STRAW,YELLOW Final    Appearance, Urine 01/09/2023 HAZY  CLEAR Final    Specific Gravity, Urine 01/09/2023 1.015  1.005 - 1.035 Final    pH, Urine 01/09/2023 5.0  5.0 - 8.0 Final    Protein, Urine 01/09/2023 NEGATIVE  NEGATIVE mg/dL Final    Glucose, Urine 01/09/2023 NEGATIVE  NEGATIVE mg/dL Final    Blood, Urine 01/09/2023 NEGATIVE  NEGATIVE Final    Ketones, Urine 01/09/2023 NEGATIVE  NEGATIVE mg/dL Final    Bilirubin, Urine 01/09/2023 NEGATIVE  NEGATIVE Final    Urobilinogen, Urine 01/09/2023 <2.0  0.0 - 1.9 mg/dL Final    Nitrite, Urine 01/09/2023 NEGATIVE  NEGATIVE Final    Leukocyte Esterase, Urine 01/09/2023 NEGATIVE  NEGATIVE Final    TSH 01/09/2023 3.66  0.44 - 3.98 mIU/L Final    Glucose 01/09/2023 157 (H)  74 - 99 mg/dL Final    Sodium 01/09/2023 140  136 - 145 mmol/L Final    Potassium 01/09/2023 3.3 (L)  3.5 - 5.3 mmol/L Final    Chloride 01/09/2023 97 (L)  98 - 107 mmol/L Final    Bicarbonate 01/09/2023 30  21 - 32 mmol/L Final    Anion Gap 01/09/2023 16  10 - 20 mmol/L Final    Urea Nitrogen 01/09/2023 22  6 - 23 mg/dL Final    Creatinine 01/09/2023 1.37 (H)  0.50 - 1.30 mg/dL Final    GFR MALE 01/09/2023 55 (A)  >90 mL/min/1.73m2 Final    Calcium 01/09/2023 9.9  8.6 - 10.6 mg/dL Final    Albumin 01/09/2023 4.8  3.4 - 5.0 g/dL Final    Alkaline Phosphatase  01/09/2023 57  33 - 136 U/L Final    Total Protein 01/09/2023 7.5  6.4 - 8.2 g/dL Final    AST 01/09/2023 33  9 - 39 U/L Final    Total Bilirubin 01/09/2023 0.9  0.0 - 1.2 mg/dL Final    ALT (SGPT) 01/09/2023 49  10 - 52 U/L Final    Cholesterol 01/09/2023 131  0 - 199 mg/dL Final    HDL 01/09/2023 35.4 (A)  mg/dL Final    Cholesterol/HDL Ratio 01/09/2023 3.7   Final    LDL 01/09/2023 51  0 - 99 mg/dL Final    VLDL 01/09/2023 45 (H)  0 - 40 mg/dL Final    Triglycerides 01/09/2023 223 (H)  0 - 149 mg/dL Final    Non HDL Cholesterol 01/09/2023 96  mg/dL Final    Hemoglobin A1C 01/09/2023 6.7 (A)  % Final    Estimated Average Glucose 01/09/2023 146  MG/DL Final    PSA 01/09/2023 0.19  0.00 - 4.00 ng/mL Final    Vitamin D, 25-Hydroxy 01/09/2023 35  ng/mL Final       Radiology: Reviewed imaging in powerchart.  No results found.    Family History   Problem Relation Name Age of Onset    Other (Other) Mother      Hypertension Mother      Diabetes Mother      Lung cancer Mother      Hypertension Father      Hyperlipidemia Father      Lung cancer Father      Other (bladder cancer) Father      Other (smoker) Father       Social History     Socioeconomic History    Marital status:      Spouse name: None    Number of children: None    Years of education: None    Highest education level: None   Occupational History    None   Tobacco Use    Smoking status: Never    Smokeless tobacco: Never   Vaping Use    Vaping status: None   Substance and Sexual Activity    Alcohol use: Never    Drug use: Never    Sexual activity: None   Other Topics Concern    None   Social History Narrative    None     Social Determinants of Health     Financial Resource Strain: Not on file   Food Insecurity: Not on file   Transportation Needs: Not on file   Physical Activity: Not on file   Stress: Not on file   Social Connections: Not on file   Intimate Partner Violence: Not on file   Housing Stability: Not on file     Past Medical History:   Diagnosis  Date    Encounter for screening for malignant neoplasm of colon 11/17/2016    Screen for colon cancer    Encounter for screening for malignant neoplasm of rectum 11/17/2016    Screening for rectal cancer    Erectile dysfunction due to arterial insufficiency 12/01/2016    Erectile dysfunction due to arterial insufficiency    Essential (primary) hypertension 11/30/2021    Benign essential hypertension    Generalized anxiety disorder 04/04/2022    MARCIA (generalized anxiety disorder)    Impaired fasting glucose 09/27/2021    Fasting hyperglycemia    Obesity, unspecified 04/04/2022    Obesity (BMI 30.0-34.9)    Other hemorrhoids 11/23/2021    Internal hemorrhoid    Pain in right shoulder 10/07/2015    Right shoulder pain    Personal history of diseases of the blood and blood-forming organs and certain disorders involving the immune mechanism 11/17/2016    History of anemia    Personal history of diseases of the skin and subcutaneous tissue 09/16/2015    History of eczema    Personal history of diseases of the skin and subcutaneous tissue 09/16/2015    History of dermatitis    Personal history of diseases of the skin and subcutaneous tissue 04/26/2016    History of allergic contact dermatitis    Personal history of diseases of the skin and subcutaneous tissue     History of contact dermatitis    Personal history of other benign neoplasm 02/26/2021    History of other benign neoplasm    Personal history of other diseases of male genital organs 09/27/2021    History of benign prostatic hyperplasia    Personal history of other diseases of the circulatory system 12/29/2017    History of hypertension    Personal history of other diseases of the digestive system 03/04/2019    History of gastroesophageal reflux (GERD)    Personal history of other diseases of the digestive system 10/05/2020    History of hemorrhoids    Personal history of other diseases of the musculoskeletal system and connective tissue 07/14/2017    History of  arthritis    Personal history of other diseases of the musculoskeletal system and connective tissue 03/04/2019    History of ganglion cyst    Personal history of other diseases of the nervous system and sense organs 07/27/2020    History of otitis media    Personal history of other diseases of the respiratory system 04/24/2014    History of sinusitis    Personal history of other diseases of the respiratory system 11/30/2015    History of sore throat    Personal history of other diseases of the respiratory system 12/11/2014    History of bronchitis    Personal history of other diseases of urinary system 10/05/2020    History of hematuria    Personal history of other endocrine, nutritional and metabolic disease 11/23/2021    History of hyperlipidemia    Personal history of other mental and behavioral disorders 03/04/2019    History of anxiety    Personal history of other mental and behavioral disorders 10/01/2018    History of depression    Personal history of pneumonia (recurrent) 03/04/2019    History of pneumonia    Personal history of pneumonia (recurrent) 12/29/2017    History of community acquired pneumonia    Personal history of urinary (tract) infections 01/18/2022    History of urinary tract infection    Rash and other nonspecific skin eruption 04/04/2022    Skin rash     Past Surgical History:   Procedure Laterality Date    HEMORRHOID SURGERY       * Cannot find OR log *    Charting was completed using voice recognition technology and may include unintended errors.

## 2023-05-09 ENCOUNTER — TELEPHONE (OUTPATIENT)
Dept: PRIMARY CARE | Facility: CLINIC | Age: 73
End: 2023-05-09
Payer: MEDICARE

## 2023-05-09 NOTE — TELEPHONE ENCOUNTER
----- Message from Cyndie Ibarra MD sent at 5/9/2023  8:52 AM EDT -----  Hyperglycemia improving kidney function improving hemoglobin A1c 6.1 follow-up 3 months

## 2023-07-13 ENCOUNTER — OFFICE VISIT (OUTPATIENT)
Dept: PRIMARY CARE | Facility: CLINIC | Age: 73
End: 2023-07-13
Payer: MEDICARE

## 2023-07-13 VITALS
HEART RATE: 79 BPM | WEIGHT: 222.6 LBS | OXYGEN SATURATION: 97 % | TEMPERATURE: 97.1 F | HEIGHT: 70 IN | DIASTOLIC BLOOD PRESSURE: 70 MMHG | BODY MASS INDEX: 31.87 KG/M2 | SYSTOLIC BLOOD PRESSURE: 115 MMHG

## 2023-07-13 DIAGNOSIS — R07.81 RIB PAIN ON RIGHT SIDE: Primary | ICD-10-CM

## 2023-07-13 DIAGNOSIS — M54.40 ACUTE RIGHT-SIDED LOW BACK PAIN WITH SCIATICA, SCIATICA LATERALITY UNSPECIFIED: ICD-10-CM

## 2023-07-13 DIAGNOSIS — E11.21 DIABETES MELLITUS WITH NEPHROPATHY (MULTI): ICD-10-CM

## 2023-07-13 DIAGNOSIS — N18.31 STAGE 3A CHRONIC KIDNEY DISEASE (MULTI): ICD-10-CM

## 2023-07-13 PROCEDURE — 96372 THER/PROPH/DIAG INJ SC/IM: CPT | Performed by: INTERNAL MEDICINE

## 2023-07-13 PROCEDURE — 3008F BODY MASS INDEX DOCD: CPT | Performed by: INTERNAL MEDICINE

## 2023-07-13 PROCEDURE — 1157F ADVNC CARE PLAN IN RCRD: CPT | Performed by: INTERNAL MEDICINE

## 2023-07-13 PROCEDURE — 1036F TOBACCO NON-USER: CPT | Performed by: INTERNAL MEDICINE

## 2023-07-13 PROCEDURE — 3044F HG A1C LEVEL LT 7.0%: CPT | Performed by: INTERNAL MEDICINE

## 2023-07-13 PROCEDURE — 1159F MED LIST DOCD IN RCRD: CPT | Performed by: INTERNAL MEDICINE

## 2023-07-13 PROCEDURE — 3078F DIAST BP <80 MM HG: CPT | Performed by: INTERNAL MEDICINE

## 2023-07-13 PROCEDURE — 1160F RVW MEDS BY RX/DR IN RCRD: CPT | Performed by: INTERNAL MEDICINE

## 2023-07-13 PROCEDURE — 99214 OFFICE O/P EST MOD 30 MIN: CPT | Performed by: INTERNAL MEDICINE

## 2023-07-13 PROCEDURE — 3074F SYST BP LT 130 MM HG: CPT | Performed by: INTERNAL MEDICINE

## 2023-07-13 RX ORDER — TRIAMCINOLONE ACETONIDE 40 MG/ML
40 INJECTION, SUSPENSION INTRA-ARTICULAR; INTRAMUSCULAR ONCE
Status: COMPLETED | OUTPATIENT
Start: 2023-07-13 | End: 2023-07-13

## 2023-07-13 RX ORDER — CYCLOBENZAPRINE HCL 10 MG
10 TABLET ORAL EVERY 8 HOURS PRN
Qty: 14 TABLET | Refills: 0 | Status: SHIPPED | OUTPATIENT
Start: 2023-07-13 | End: 2023-12-08 | Stop reason: ALTCHOICE

## 2023-07-13 RX ADMIN — TRIAMCINOLONE ACETONIDE 40 MG: 40 INJECTION, SUSPENSION INTRA-ARTICULAR; INTRAMUSCULAR at 12:37

## 2023-07-13 NOTE — PROGRESS NOTES
Subjective   Patient ID: Mariano Jolley is a 72 y.o. male who presents for Back Pain (Low back pain for the last month now ).    Assessment/Plan     Problem List Items Addressed This Visit          Endocrine/Metabolic    Diabetes mellitus with nephropathy (CMS/HCC)     Diabetes is chronic disease, it does not get cured but it can be controlled, in modern medicine there are variety of measures taken to control DM. Usually we want to preserve beta cell functions. Please understand the disease and how our life style can affect control of glycemia. Diabetes leads to macro and microvascular complications and they could be devastating. It is important to have annual eye check and frequent foot inspection and foot inspection. Kidney dysfunction including dialysis, foot amputations, neuropathy, foot ulcers and accelerated atherosclerotic vascular disease are known complications of uncontrolled DM, pt was educated and explained.              Genitourinary and Reproductive    Stage 3a chronic kidney disease     CKD and various stages of CKD and significance explained, CKD is very common and rising diagnosis among [place] adults. Main culprits for CKD etiology needs to be attended well. GFR values explained. Nephrotoxic agents has to be avoided, plenty of water consumption is always beneficial. Avoid NSAIDs, always check with MD about usage of OTC medications or any other Rx given by other providers. GFR less then 10 usually will need renal replacement therapy. Episodic check on renal functions needed. Always ask MD about GFR at next visit. Avoid dehydration.             Musculoskeletal and Injuries    Acute right-sided low back pain with sciatica       Pulmonary and Pneumonias    Rib pain on right side - Primary       HPI 78-year-old patient have diabetes hypertension hyperlipidemia hypothyroidism chronic kidney disease stage III anxiety with depression complaint acute on chronic right-sided mid back pain right-sided rib pain  "onset gradually duration few months progressed slowly aggravating factor activity relieving factor none associated problem possible osteoarthritis disc disease muscle spasm inflammation advised to get CBC sed rate x-ray of the ribs x-ray of the spine given Kenalog 40 intramuscular Flexeril 10 mg at night vitamin C vitamin D ibuprofen check CPK follow-up    Advised to monitor the blood pressure blood sugar and thyroid because of the cortisone injection    No Known Allergies    Current Outpatient Medications   Medication Sig Dispense Refill    amLODIPine (Norvasc) 10 mg tablet Take 1 tablet (10 mg) by mouth once daily.      atorvastatin (Lipitor) 80 mg tablet Take by mouth.      empagliflozin-metformin (Synjardy) 12.5-1,000 mg Take 1 tablet by mouth once daily. 90 tablet 3    hydrOXYzine HCL (Atarax) 25 mg tablet Take 1 tablet (25 mg) by mouth once daily in the evening.      levothyroxine (Synthroid, Levoxyl) 25 mcg tablet TAKE 1/2 TABLET BY MOUTH IN THE MORNING      losartan-hydrochlorothiazide (Hyzaar) 100-25 mg tablet Take 1 tablet by mouth once daily in the morning. Take before meals.      PARoxetine (Paxil) 20 mg tablet Take 1 tablet (20 mg) by mouth once daily.       No current facility-administered medications for this visit.       Objective   Visit Vitals  /70 (BP Location: Left arm, Patient Position: Sitting, BP Cuff Size: Large adult)   Pulse 79   Temp 36.2 °C (97.1 °F)   Ht 1.778 m (5' 10\")   Wt 101 kg (222 lb 9.6 oz)   SpO2 97%   BMI 31.94 kg/m²   Smoking Status Never   BSA 2.23 m²     Physical Exam  Constitutional:       General: He is not in acute distress.     Appearance: Normal appearance.  Anxiety obesity  HENT:      Head: Normocephalic and atraumatic.      Nose: Nose normal.   Eyes:      Extraocular Movements: Extraocular movements intact.      Conjunctiva/sclera: Conjunctivae normal.   Cardiovascular:      Rate and Rhythm: Normal rate and regular rhythm.  Heart murmur  Pulmonary:      Effort: " Pulmonary effort is normal.      Breath sounds: Normal breath sounds.  Crackles  Skin:     General: Skin is warm.   Neurological:      Mental Status: He is alert and oriented to person, place, and time.  Neurology  Psychiatric:         Mood and Affect: Mood normal.         Behavior: Behavior normal.    Musculoskeletal right-sided rib tenderness right-sided's periscapular tenderness thoracis and the thoracolumbar spine tenderness  Immunization History   Administered Date(s) Administered    Influenza, seasonal, injectable 10/26/2016, 10/10/2022    Influenza, seasonal, injectable, preservative free 09/16/2015    Pfizer Gray Cap SARS-CoV-2 04/05/2022    Pfizer Purple Cap SARS-CoV-2 02/27/2021, 03/27/2021, 10/11/2021    Pfizer Sars-cov-2 Bivalent 30 mcg/0.3 mL 10/18/2022    Pneumococcal Conjugate PCV 13 07/14/2017    Pneumococcal Polysaccharide PPSV23 09/30/2019    SARS-CoV-2, Unspecified 04/05/2022    Zoster, Recombinant 06/29/2019       Review of Systems    Lab on 05/08/2023   Component Date Value Ref Range Status    ALBUMIN (MG/L) IN URINE 05/08/2023 15.9  Not Established mg/L Final    Albumin/Creatine Ratio 05/08/2023 9.2  0.0 - 30.0 ug/mg crt Final    Creatinine, Urine 05/08/2023 172.0  20.0 - 370.0 mg/dL Final    Glucose 05/08/2023 134 (H)  74 - 99 mg/dL Final    Sodium 05/08/2023 142  136 - 145 mmol/L Final    Potassium 05/08/2023 3.6  3.5 - 5.3 mmol/L Final    Chloride 05/08/2023 101  98 - 107 mmol/L Final    Bicarbonate 05/08/2023 31  21 - 32 mmol/L Final    Anion Gap 05/08/2023 14  10 - 20 mmol/L Final    Urea Nitrogen 05/08/2023 15  6 - 23 mg/dL Final    Creatinine 05/08/2023 1.14  0.50 - 1.30 mg/dL Final    GFR MALE 05/08/2023 68  >90 mL/min/1.73m2 Final    Calcium 05/08/2023 10.1  8.6 - 10.6 mg/dL Final    Albumin 05/08/2023 5.0  3.4 - 5.0 g/dL Final    Alkaline Phosphatase 05/08/2023 47  33 - 136 U/L Final    Total Protein 05/08/2023 7.6  6.4 - 8.2 g/dL Final    AST 05/08/2023 30  9 - 39 U/L Final    Total  Bilirubin 05/08/2023 0.9  0.0 - 1.2 mg/dL Final    ALT (SGPT) 05/08/2023 33  10 - 52 U/L Final    Hemoglobin A1C 05/08/2023 6.1 (A)  % Final    Estimated Average Glucose 05/08/2023 128  MG/DL Final    Magnesium 05/08/2023 2.10  1.60 - 2.40 mg/dL Final       Radiology: Reviewed imaging in powerchart.  No results found.    Family History   Problem Relation Name Age of Onset    Other (Other) Mother      Hypertension Mother      Diabetes Mother      Lung cancer Mother      Hypertension Father      Hyperlipidemia Father      Lung cancer Father      Other (bladder cancer) Father      Other (smoker) Father       Social History     Socioeconomic History    Marital status:      Spouse name: None    Number of children: None    Years of education: None    Highest education level: None   Occupational History    None   Tobacco Use    Smoking status: Never    Smokeless tobacco: Never   Substance and Sexual Activity    Alcohol use: Never    Drug use: Never    Sexual activity: None   Other Topics Concern    None   Social History Narrative    None     Social Determinants of Health     Financial Resource Strain: Not on file   Food Insecurity: Not on file   Transportation Needs: Not on file   Physical Activity: Not on file   Stress: Not on file   Social Connections: Not on file   Intimate Partner Violence: Not on file   Housing Stability: Not on file     Past Medical History:   Diagnosis Date    Encounter for screening for malignant neoplasm of colon 11/17/2016    Screen for colon cancer    Encounter for screening for malignant neoplasm of rectum 11/17/2016    Screening for rectal cancer    Erectile dysfunction due to arterial insufficiency 12/01/2016    Erectile dysfunction due to arterial insufficiency    Essential (primary) hypertension 11/30/2021    Benign essential hypertension    Generalized anxiety disorder 04/04/2022    MARCIA (generalized anxiety disorder)    Impaired fasting glucose 09/27/2021    Fasting hyperglycemia     Obesity, unspecified 04/04/2022    Obesity (BMI 30.0-34.9)    Other hemorrhoids 11/23/2021    Internal hemorrhoid    Pain in right shoulder 10/07/2015    Right shoulder pain    Personal history of diseases of the blood and blood-forming organs and certain disorders involving the immune mechanism 11/17/2016    History of anemia    Personal history of diseases of the skin and subcutaneous tissue 09/16/2015    History of eczema    Personal history of diseases of the skin and subcutaneous tissue 09/16/2015    History of dermatitis    Personal history of diseases of the skin and subcutaneous tissue 04/26/2016    History of allergic contact dermatitis    Personal history of diseases of the skin and subcutaneous tissue     History of contact dermatitis    Personal history of other benign neoplasm 02/26/2021    History of other benign neoplasm    Personal history of other diseases of male genital organs 09/27/2021    History of benign prostatic hyperplasia    Personal history of other diseases of the circulatory system 12/29/2017    History of hypertension    Personal history of other diseases of the digestive system 03/04/2019    History of gastroesophageal reflux (GERD)    Personal history of other diseases of the digestive system 10/05/2020    History of hemorrhoids    Personal history of other diseases of the musculoskeletal system and connective tissue 07/14/2017    History of arthritis    Personal history of other diseases of the musculoskeletal system and connective tissue 03/04/2019    History of ganglion cyst    Personal history of other diseases of the nervous system and sense organs 07/27/2020    History of otitis media    Personal history of other diseases of the respiratory system 04/24/2014    History of sinusitis    Personal history of other diseases of the respiratory system 11/30/2015    History of sore throat    Personal history of other diseases of the respiratory system 12/11/2014    History of  bronchitis    Personal history of other diseases of urinary system 10/05/2020    History of hematuria    Personal history of other endocrine, nutritional and metabolic disease 11/23/2021    History of hyperlipidemia    Personal history of other mental and behavioral disorders 03/04/2019    History of anxiety    Personal history of other mental and behavioral disorders 10/01/2018    History of depression    Personal history of pneumonia (recurrent) 03/04/2019    History of pneumonia    Personal history of pneumonia (recurrent) 12/29/2017    History of community acquired pneumonia    Personal history of urinary (tract) infections 01/18/2022    History of urinary tract infection    Rash and other nonspecific skin eruption 04/04/2022    Skin rash     Past Surgical History:   Procedure Laterality Date    HEMORRHOID SURGERY         Charting was completed using voice recognition technology and may include unintended errors.

## 2023-07-14 ENCOUNTER — TELEPHONE (OUTPATIENT)
Dept: PRIMARY CARE | Facility: CLINIC | Age: 73
End: 2023-07-14
Payer: MEDICARE

## 2023-07-14 DIAGNOSIS — R07.81 RIB PAIN ON RIGHT SIDE: ICD-10-CM

## 2023-07-14 RX ORDER — NAPROXEN 500 MG/1
500 TABLET ORAL 2 TIMES DAILY PRN
Qty: 28 TABLET | Refills: 0 | Status: SHIPPED | OUTPATIENT
Start: 2023-07-14 | End: 2023-12-08 | Stop reason: ALTCHOICE

## 2023-08-08 ENCOUNTER — OFFICE VISIT (OUTPATIENT)
Dept: PRIMARY CARE | Facility: CLINIC | Age: 73
End: 2023-08-08
Payer: MEDICARE

## 2023-08-08 ENCOUNTER — LAB (OUTPATIENT)
Dept: LAB | Facility: LAB | Age: 73
End: 2023-08-08
Payer: MEDICARE

## 2023-08-08 VITALS
OXYGEN SATURATION: 97 % | HEIGHT: 70 IN | SYSTOLIC BLOOD PRESSURE: 111 MMHG | HEART RATE: 75 BPM | BODY MASS INDEX: 31.35 KG/M2 | WEIGHT: 219 LBS | DIASTOLIC BLOOD PRESSURE: 71 MMHG | TEMPERATURE: 97.3 F

## 2023-08-08 DIAGNOSIS — F41.9 ANXIETY: ICD-10-CM

## 2023-08-08 DIAGNOSIS — Z13.820 SCREENING FOR OSTEOPOROSIS: ICD-10-CM

## 2023-08-08 DIAGNOSIS — N18.31 STAGE 3A CHRONIC KIDNEY DISEASE (MULTI): ICD-10-CM

## 2023-08-08 DIAGNOSIS — E66.01 CLASS 2 SEVERE OBESITY DUE TO EXCESS CALORIES WITH SERIOUS COMORBIDITY IN ADULT, UNSPECIFIED BMI (MULTI): ICD-10-CM

## 2023-08-08 DIAGNOSIS — E11.59 HYPERTENSION ASSOCIATED WITH DIABETES (MULTI): ICD-10-CM

## 2023-08-08 DIAGNOSIS — I15.2 HYPERTENSION ASSOCIATED WITH DIABETES (MULTI): ICD-10-CM

## 2023-08-08 DIAGNOSIS — E11.69 HYPERLIPIDEMIA ASSOCIATED WITH TYPE 2 DIABETES MELLITUS (MULTI): ICD-10-CM

## 2023-08-08 DIAGNOSIS — E78.5 HYPERLIPIDEMIA ASSOCIATED WITH TYPE 2 DIABETES MELLITUS (MULTI): ICD-10-CM

## 2023-08-08 DIAGNOSIS — E11.618 TYPE 2 DIABETES MELLITUS WITH OTHER DIABETIC ARTHROPATHY, WITHOUT LONG-TERM CURRENT USE OF INSULIN (MULTI): Primary | ICD-10-CM

## 2023-08-08 DIAGNOSIS — E11.618 TYPE 2 DIABETES MELLITUS WITH OTHER DIABETIC ARTHROPATHY, WITHOUT LONG-TERM CURRENT USE OF INSULIN (MULTI): ICD-10-CM

## 2023-08-08 PROBLEM — R79.89 HIGH THYROID STIMULATING HORMONE (TSH) LEVEL: Status: RESOLVED | Noted: 2023-03-14 | Resolved: 2023-08-08

## 2023-08-08 PROBLEM — R07.81 RIB PAIN ON RIGHT SIDE: Status: RESOLVED | Noted: 2023-07-13 | Resolved: 2023-08-08

## 2023-08-08 PROBLEM — E87.6 HYPOKALEMIA: Status: RESOLVED | Noted: 2023-03-14 | Resolved: 2023-08-08

## 2023-08-08 PROBLEM — E11.21 DIABETES MELLITUS WITH NEPHROPATHY (MULTI): Status: RESOLVED | Noted: 2023-03-14 | Resolved: 2023-08-08

## 2023-08-08 PROBLEM — M54.40 ACUTE RIGHT-SIDED LOW BACK PAIN WITH SCIATICA: Status: RESOLVED | Noted: 2023-07-13 | Resolved: 2023-08-08

## 2023-08-08 LAB
ALBUMIN (MG/L) IN URINE: 27.7 MG/L
ALBUMIN/CREATININE (UG/MG) IN URINE: 11.2 UG/MG CRT (ref 0–30)
CREATININE (MG/DL) IN URINE: 247 MG/DL (ref 20–370)
ESTIMATED AVERAGE GLUCOSE FOR HBA1C: 137 MG/DL
HEMOGLOBIN A1C/HEMOGLOBIN TOTAL IN BLOOD: 6.4 %

## 2023-08-08 PROCEDURE — 1159F MED LIST DOCD IN RCRD: CPT | Performed by: INTERNAL MEDICINE

## 2023-08-08 PROCEDURE — 3078F DIAST BP <80 MM HG: CPT | Performed by: INTERNAL MEDICINE

## 2023-08-08 PROCEDURE — 82570 ASSAY OF URINE CREATININE: CPT

## 2023-08-08 PROCEDURE — 99214 OFFICE O/P EST MOD 30 MIN: CPT | Performed by: INTERNAL MEDICINE

## 2023-08-08 PROCEDURE — 1157F ADVNC CARE PLAN IN RCRD: CPT | Performed by: INTERNAL MEDICINE

## 2023-08-08 PROCEDURE — 3044F HG A1C LEVEL LT 7.0%: CPT | Performed by: INTERNAL MEDICINE

## 2023-08-08 PROCEDURE — 1160F RVW MEDS BY RX/DR IN RCRD: CPT | Performed by: INTERNAL MEDICINE

## 2023-08-08 PROCEDURE — 3008F BODY MASS INDEX DOCD: CPT | Performed by: INTERNAL MEDICINE

## 2023-08-08 PROCEDURE — 83036 HEMOGLOBIN GLYCOSYLATED A1C: CPT

## 2023-08-08 PROCEDURE — 82043 UR ALBUMIN QUANTITATIVE: CPT

## 2023-08-08 PROCEDURE — 36415 COLL VENOUS BLD VENIPUNCTURE: CPT

## 2023-08-08 PROCEDURE — 1036F TOBACCO NON-USER: CPT | Performed by: INTERNAL MEDICINE

## 2023-08-08 PROCEDURE — 3074F SYST BP LT 130 MM HG: CPT | Performed by: INTERNAL MEDICINE

## 2023-08-08 RX ORDER — GLUCOSAM/CHONDRO/HERB 149/HYAL 750-100 MG
TABLET ORAL
COMMUNITY
End: 2023-12-08 | Stop reason: WASHOUT

## 2023-08-08 NOTE — PROGRESS NOTES
Subjective   Patient ID: Mariano Jolley is a 73 y.o. male who presents for Follow-up (3 month ).    Assessment/Plan     Problem List Items Addressed This Visit       Hyperlipidemia associated with type 2 diabetes mellitus (CMS/HCC)     Diabetes is chronic disease, it does not get cured but it can be controlled, in modern medicine there are variety of measures taken to control DM. Usually we want to preserve beta cell functions. Please understand the disease and how our life style can affect control of glycemia. Diabetes leads to macro and microvascular complications and they could be devastating. It is important to have annual eye check and frequent foot inspection and foot inspection. Kidney dysfunction including dialysis, foot amputations, neuropathy, foot ulcers and accelerated atherosclerotic vascular disease are known complications of uncontrolled DM, pt was educated and explained.           Hypertension associated with diabetes (CMS/HCC)     Patients BP readings reviewed and addressed, as we age our arteries turn stiffer and less elastic. Restricting salt consumption and staying physically fit with regular exercise regimen is the only way to keep our vasculature less tonic. Studies have shown that keeping ideal body wt, exercise routine about 140 to 150 minutes a week, eating variety of plant based diet and drinking plentiful water are quite helpful. Monitor BP twice or once a week at home and bring log to be reviewed by me. Uncontrolled BP has long term consequences including heart failure, myocardial infarction, accelerated atherosclerosis and kidney dysfunction. Therapy reviewed and explained.           Obesity, unspecified     I spent <15 minutes face to face with individual providing recommendations for nutrition choices and exercise plan to help achieve weight reduction goals. Obesity is systemic disorder and it can bring devastating morbidities in furture. It is a matter of calorie gain and loss,  keeping bodybank in negative calorie balance mode is the way to sustain weight loss.Diet has a big role in reducing excess body wt. Scheduled and well planned meals and food intake with watchfulness and understanding of calorie portion and distribution is key to understand. Bariatric surgery is another option if sustained wt loss is not achieved and faced with one or more comorbidities with morbid obesity. Weigh yourself twice a week to understand and follow wt loss goals.           RESOLVED: Anxiety    Stage 3a chronic kidney disease (CMS/HCC)     CKD and various stages of CKD and significance explained, CKD is very common and rising diagnosis among [place] adults. Main culprits for CKD etiology needs to be attended well. GFR values explained. Nephrotoxic agents has to be avoided, plenty of water consumption is always beneficial. Avoid NSAIDs, always check with MD about usage of OTC medications or any other Rx given by other providers. GFR less then 10 usually will need renal replacement therapy. Episodic check on renal functions needed. Always ask MD about GFR at next visit. Avoid dehydration.          Screening for osteoporosis    Relevant Orders    Albumin , Urine Random    Hemoglobin A1C    XR DEXA bone density    Type 2 diabetes mellitus with diabetic arthropathy, without long-term current use of insulin (CMS/HCC) - Primary    Relevant Orders    Albumin , Urine Random    Hemoglobin A1C       HPI this is a 73-year-old patient who had a history of metabolic syndrome diabetes with complication hypertension hyperlipidemia neuropathy nephropathy proteinuria hypothyroidism anxiety depression complaining of the rib pain back pain underwent for the x-ray mild arthritis    Negative for hematuria rectal bleeding hypotension hypoglycemia or fall    Negative for COVID-19 or suicide  Past Medical History:   Diagnosis Date    Encounter for screening for malignant neoplasm of colon 11/17/2016    Screen for colon cancer     Encounter for screening for malignant neoplasm of rectum 11/17/2016    Screening for rectal cancer    Erectile dysfunction due to arterial insufficiency 12/01/2016    Erectile dysfunction due to arterial insufficiency    Essential (primary) hypertension 11/30/2021    Benign essential hypertension    Generalized anxiety disorder 04/04/2022    MARCIA (generalized anxiety disorder)    Impaired fasting glucose 09/27/2021    Fasting hyperglycemia    Obesity, unspecified 04/04/2022    Obesity (BMI 30.0-34.9)    Other hemorrhoids 11/23/2021    Internal hemorrhoid    Pain in right shoulder 10/07/2015    Right shoulder pain    Personal history of diseases of the blood and blood-forming organs and certain disorders involving the immune mechanism 11/17/2016    History of anemia    Personal history of diseases of the skin and subcutaneous tissue 09/16/2015    History of eczema    Personal history of diseases of the skin and subcutaneous tissue 09/16/2015    History of dermatitis    Personal history of diseases of the skin and subcutaneous tissue 04/26/2016    History of allergic contact dermatitis    Personal history of diseases of the skin and subcutaneous tissue     History of contact dermatitis    Personal history of other benign neoplasm 02/26/2021    History of other benign neoplasm    Personal history of other diseases of male genital organs 09/27/2021    History of benign prostatic hyperplasia    Personal history of other diseases of the circulatory system 12/29/2017    History of hypertension    Personal history of other diseases of the digestive system 03/04/2019    History of gastroesophageal reflux (GERD)    Personal history of other diseases of the digestive system 10/05/2020    History of hemorrhoids    Personal history of other diseases of the musculoskeletal system and connective tissue 07/14/2017    History of arthritis    Personal history of other diseases of the musculoskeletal system and connective tissue  03/04/2019    History of ganglion cyst    Personal history of other diseases of the nervous system and sense organs 07/27/2020    History of otitis media    Personal history of other diseases of the respiratory system 04/24/2014    History of sinusitis    Personal history of other diseases of the respiratory system 11/30/2015    History of sore throat    Personal history of other diseases of the respiratory system 12/11/2014    History of bronchitis    Personal history of other diseases of urinary system 10/05/2020    History of hematuria    Personal history of other endocrine, nutritional and metabolic disease 11/23/2021    History of hyperlipidemia    Personal history of other mental and behavioral disorders 03/04/2019    History of anxiety    Personal history of other mental and behavioral disorders 10/01/2018    History of depression    Personal history of pneumonia (recurrent) 03/04/2019    History of pneumonia    Personal history of pneumonia (recurrent) 12/29/2017    History of community acquired pneumonia    Personal history of urinary (tract) infections 01/18/2022    History of urinary tract infection    Rash and other nonspecific skin eruption 04/04/2022    Skin rash     Past Surgical History:   Procedure Laterality Date    HEMORRHOID SURGERY       No Known Allergies  Current Outpatient Medications   Medication Sig Dispense Refill    amLODIPine (Norvasc) 10 mg tablet Take 1 tablet (10 mg) by mouth once daily.      atorvastatin (Lipitor) 80 mg tablet Take by mouth.      cyclobenzaprine (Flexeril) 10 mg tablet Take 1 tablet (10 mg) by mouth every 8 hours if needed for muscle spasms. 14 tablet 0    empagliflozin-metformin (Synjardy) 12.5-1,000 mg Take 1 tablet by mouth once daily. 90 tablet 3    hydrOXYzine HCL (Atarax) 25 mg tablet Take 1 tablet (25 mg) by mouth once daily in the evening.      levothyroxine (Synthroid, Levoxyl) 25 mcg tablet TAKE 1/2 TABLET BY MOUTH IN THE MORNING       losartan-hydrochlorothiazide (Hyzaar) 100-25 mg tablet Take 1 tablet by mouth once daily in the morning. Take before meals.      naproxen (Naprosyn) 500 mg tablet Take 1 tablet (500 mg) by mouth 2 times a day as needed for mild pain (1 - 3) (pain). 28 tablet 0    omega 3-dha-epa-fish oil (Fish OiL) 1,000 mg (120 mg-180 mg) capsule Take by mouth.      PARoxetine (Paxil) 20 mg tablet Take 1 tablet (20 mg) by mouth once daily.       No current facility-administered medications for this visit.     Family History   Problem Relation Name Age of Onset    Other (Other) Mother      Hypertension Mother      Diabetes Mother      Lung cancer Mother      Hypertension Father      Hyperlipidemia Father      Lung cancer Father      Other (bladder cancer) Father      Other (smoker) Father       Social History     Socioeconomic History    Marital status:      Spouse name: None    Number of children: None    Years of education: None    Highest education level: None   Occupational History    None   Tobacco Use    Smoking status: Never    Smokeless tobacco: Never   Substance and Sexual Activity    Alcohol use: Never    Drug use: Never    Sexual activity: None   Other Topics Concern    None   Social History Narrative    None     Social Determinants of Health     Financial Resource Strain: Not on file   Food Insecurity: Not on file   Transportation Needs: Not on file   Physical Activity: Not on file   Stress: Not on file   Social Connections: Not on file   Intimate Partner Violence: Not on file   Housing Stability: Not on file     Immunization History   Administered Date(s) Administered    Influenza, Unspecified 10/10/2022    Influenza, seasonal, injectable 08/24/2011, 08/27/2012, 09/05/2013, 09/16/2015, 10/26/2016, 09/08/2017, 10/10/2022    Influenza, seasonal, injectable, preservative free 09/16/2015    Pfizer COVID-19 vaccine, bivalent, age 12 years and older (30 mcg/0.3 mL) 10/18/2022    Pfizer Gray Cap SARS-CoV-2 04/05/2022     "Pfizer Purple Cap SARS-CoV-2 02/27/2021, 03/27/2021, 10/11/2021    Pneumococcal conjugate vaccine, 13-valent (PREVNAR 13) 07/14/2017    Pneumococcal polysaccharide vaccine, 23-valent, age 2 years and older (PNEUMOVAX 23) 09/30/2019    SARS-CoV-2, Unspecified 04/05/2022    Zoster vaccine, recombinant, adult (SHINGRIX) 06/29/2019       Review of Systems  Review of systems is otherwise negative unless stated above or in history of present illness.    Objective   Visit Vitals  /71 (BP Location: Left arm, Patient Position: Sitting, BP Cuff Size: Large adult)   Pulse 75   Temp 36.3 °C (97.3 °F)   Ht 1.778 m (5' 10\")   Wt 99.3 kg (219 lb)   SpO2 97%   BMI 31.42 kg/m²   Smoking Status Never   BSA 2.21 m²     Physical Exam  Constitutional:       General: not in acute distress.  Obesity   HENT:      Head: Normocephalic and atraumatic.      Nose: Nose normal.   Eyes:      Extraocular Movements: Extraocular movements intact.      Conjunctiva/sclera: Conjunctivae normal.   Cardiovascular: Systolic heart murmur     Rate and Rhythm: Normal rate ,    Pulmonary: Basal crackle     Effort: Pulmonary effort is normal.      Breath sounds: Normal, Bilat Equal AE  Skin:     General: Skin is warm.   Neurological:      Mental Status: He is alert and oriented to person, place, and time.   Psychiatric:         Mood and Affect: Mood normal.         Behavior: Behavior normal.   Musculoskeletal   Arthritis of spine joint-no swelling or tenderness    Lab on 05/08/2023   Component Date Value Ref Range Status    ALBUMIN (MG/L) IN URINE 05/08/2023 15.9  Not Established mg/L Final    Albumin/Creatine Ratio 05/08/2023 9.2  0.0 - 30.0 ug/mg crt Final    Creatinine, Urine 05/08/2023 172.0  20.0 - 370.0 mg/dL Final    Glucose 05/08/2023 134 (H)  74 - 99 mg/dL Final    Sodium 05/08/2023 142  136 - 145 mmol/L Final    Potassium 05/08/2023 3.6  3.5 - 5.3 mmol/L Final    Chloride 05/08/2023 101  98 - 107 mmol/L Final    Bicarbonate 05/08/2023 31  21 - " 32 mmol/L Final    Anion Gap 05/08/2023 14  10 - 20 mmol/L Final    Urea Nitrogen 05/08/2023 15  6 - 23 mg/dL Final    Creatinine 05/08/2023 1.14  0.50 - 1.30 mg/dL Final    GFR MALE 05/08/2023 68  >90 mL/min/1.73m2 Final    Calcium 05/08/2023 10.1  8.6 - 10.6 mg/dL Final    Albumin 05/08/2023 5.0  3.4 - 5.0 g/dL Final    Alkaline Phosphatase 05/08/2023 47  33 - 136 U/L Final    Total Protein 05/08/2023 7.6  6.4 - 8.2 g/dL Final    AST 05/08/2023 30  9 - 39 U/L Final    Total Bilirubin 05/08/2023 0.9  0.0 - 1.2 mg/dL Final    ALT (SGPT) 05/08/2023 33  10 - 52 U/L Final    Hemoglobin A1C 05/08/2023 6.1 (A)  % Final    Estimated Average Glucose 05/08/2023 128  MG/DL Final    Magnesium 05/08/2023 2.10  1.60 - 2.40 mg/dL Final       Radiology: Reviewed imaging in powerchart.  XR ribs unilateral    Result Date: 7/13/2023  XR RIBS UNILATERAL CLINICAL STATEMENT: PAIN. TECHNOLOGIST NOTES: WHAT SYMPTOMS ARE YOU EXPERIENCING? - RIGHT RIBS  AND BACK PAIN  COMPARISON: 12/29/2017 CXR FINDINGS:  A single PA erect view of the chest, taken at 0938 hours, shows the heart to be normal in size. The lung fields and costophrenic angles are clear. There is no evidence of a pneumothorax or free air beneath the diaphragm. There are degenerative changes within the spine. 4 views of the right ribs show no fracture or lytic or blastic change. IMPRESSION: 1. No active infiltrate or vascular congestion. 2. No apparent fracture involving the right ribs. Electronically signed by:  Diana Knowles MD  7/13/2023 10:02 AM CDT Workstation: [id number] Technologist:   Dictated By:   DIANA KNOWLES MD Signed By:     DIANA KNOWLES MD Signed Out:    07/13/23 11:02:05    XR thoracic spine 3 views    Result Date: 7/13/2023  XR THORACIC SPINE 3 VIEWS CLINICAL STATEMENT: PAIN. TECHNOLOGIST NOTES: WHAT SYMPTOMS ARE YOU EXPERIENCING? - RIGHT RIBS  AND BACK PAIN  COMPARISON: None FINDINGS:  3 views (4 images) of the thoracic spine show a normal dorsal kyphotic  curve. There is no fracture, subluxation, or significant disc space narrowing. There is marginal lipping and spurring from the vertebral bodies with some bony bridging. No apparent lytic or blastic changes are seen. IMPRESSION: Mild degenerative changes in the thoracic spine without fracture or subluxation. Electronically signed by:  Diana Knowles MD  7/13/2023 10:26 AM CDT Workstation: [id number] Technologist:   Dictated By:   DIANA KNOWLES MD Signed By:     DIANA KNOWLES MD Signed Out:    07/13/23 11:26:15        Charting was completed using voice recognition technology and may include unintended errors.

## 2023-08-10 ENCOUNTER — TELEPHONE (OUTPATIENT)
Dept: PRIMARY CARE | Facility: CLINIC | Age: 73
End: 2023-08-10
Payer: MEDICARE

## 2023-08-10 NOTE — TELEPHONE ENCOUNTER
----- Message from Cyndie Ibarra MD sent at 8/9/2023 11:46 AM EDT -----  Hemoglobin A1c 6.4  Continue diet exercise current medication

## 2023-09-04 DIAGNOSIS — I15.2 HYPERTENSION ASSOCIATED WITH DIABETES (MULTI): ICD-10-CM

## 2023-09-04 DIAGNOSIS — F32.A DEPRESSIVE DISORDER: ICD-10-CM

## 2023-09-04 DIAGNOSIS — E11.59 HYPERTENSION ASSOCIATED WITH DIABETES (MULTI): ICD-10-CM

## 2023-09-05 PROBLEM — F32.A DEPRESSIVE DISORDER: Status: ACTIVE | Noted: 2023-09-05

## 2023-09-05 RX ORDER — AMLODIPINE BESYLATE 10 MG/1
10 TABLET ORAL DAILY
Qty: 90 TABLET | Refills: 3 | Status: SHIPPED | OUTPATIENT
Start: 2023-09-05

## 2023-09-05 RX ORDER — PAROXETINE HYDROCHLORIDE 20 MG/1
20 TABLET, FILM COATED ORAL DAILY
Qty: 90 TABLET | Refills: 3 | Status: SHIPPED | OUTPATIENT
Start: 2023-09-05

## 2023-10-02 NOTE — ASSESSMENT & PLAN NOTE
Subjective:       Patient ID: Handy Adams is a 68 y.o. male.    Chief Complaint: Joint Swelling    HPI  Pt here for eval of elbow pain and swelling.Noticed swelling 2-3 weeks ago with swelling on right elbow. Denies any pain to right elbow. Still using right extremity without issues. Not dropping items. No numbness tingling burning sensation in right hands, forearm or the right extremity. The swelling has been unchanged. No therapies tried due to swelling in right elbow does not cause discomfort. He does report placing pressure on his right elbow frequently when sitting at home, when.    All of your core healthy metrics are met.      Social History     Social History Narrative    Not on file       Family History   Problem Relation Age of Onset    Cancer Mother         Lung Cancer    Cancer Father         Colon cancer    Diabetes Father     Hypertension Father     No Known Problems Sister     No Known Problems Brother     No Known Problems Maternal Aunt     No Known Problems Maternal Uncle     No Known Problems Paternal Aunt     No Known Problems Paternal Uncle     No Known Problems Maternal Grandmother     No Known Problems Maternal Grandfather     No Known Problems Paternal Grandmother     No Known Problems Paternal Grandfather     Amblyopia Neg Hx     Blindness Neg Hx     Cataracts Neg Hx     Glaucoma Neg Hx     Macular degeneration Neg Hx     Retinal detachment Neg Hx     Strabismus Neg Hx     Stroke Neg Hx     Thyroid disease Neg Hx        Current Outpatient Medications:     atorvastatin (LIPITOR) 20 MG tablet, Take 1 tablet by mouth once daily, Disp: 90 tablet, Rfl: 2    blood sugar diagnostic Strp, To test twice daily, Disp: 100 each, Rfl: 3    chlorthalidone (HYGROTEN) 25 MG Tab, Take 1 tablet by mouth once daily, Disp: 90 tablet, Rfl: 0    ergocalciferol (ERGOCALCIFEROL) 50,000 unit Cap, Take 1 capsule (50,000 Units total) by mouth every 7 days., Disp: 12 capsule, Rfl: 3    ferrous sulfate 325 (65 FE) MG  Patients BP readings reviewed and addressed, as we age our arteries turn stiffer and less elastic. Restricting salt consumption and staying physically fit with regular exercise regimen is the only way to keep our vasculature less tonic. Studies have shown that keeping ideal body wt, exercise routine about 140 to 150 minutes a week, eating variety of plant based diet and drinking plentiful water are quite helpful. Monitor BP twice or once a week at home and bring log to be reviewed by me. Uncontrolled BP has long term consequences including heart failure, myocardial infarction, accelerated atherosclerosis and kidney dysfunction. Therapy reviewed and explained.     EC tablet, Take 1 tablet (325 mg total) by mouth 2 (two) times daily., Disp: 180 tablet, Rfl: 3    labetaloL (NORMODYNE) 200 MG tablet, Take 1 tablet by mouth twice daily, Disp: 180 tablet, Rfl: 0    lancets Misc, 1 lancet by Misc.(Non-Drug; Combo Route) route 2 (two) times daily with meals., Disp: 100 each, Rfl: 11    linaGLIPtin (TRADJENTA) 5 mg Tab tablet, Take 1 tablet (5 mg total) by mouth once daily., Disp: 90 tablet, Rfl: 2    NIFEdipine (PROCARDIA-XL) 90 MG (OSM) 24 hr tablet, Take 1 tablet (90 mg total) by mouth once daily., Disp: 90 tablet, Rfl: 3    pioglitazone (ACTOS) 30 MG tablet, Take 1 tablet (30 mg total) by mouth once daily., Disp: 90 tablet, Rfl: 2    spironolactone (ALDACTONE) 25 MG tablet, Take 1 tablet (25 mg total) by mouth once daily., Disp: 90 tablet, Rfl: 3    triamcinolone acetonide 0.1% (KENALOG) 0.1 % ointment, Apply topically 2 (two) times daily., Disp: 80 g, Rfl: 1    valsartan (DIOVAN) 320 MG tablet, Take 1 tablet (320 mg total) by mouth once daily., Disp: 90 tablet, Rfl: 3    blood-glucose meter kit, Use as instructed, Disp: 1 each, Rfl: 0    sodium bicarbonate 650 MG tablet, Take 1 tablet (650 mg total) by mouth 2 (two) times daily., Disp: 60 tablet, Rfl: 0    Review of Systems   Constitutional:  Negative for chills and fever.   Respiratory:  Negative for shortness of breath.    Cardiovascular:  Negative for chest pain.   Gastrointestinal:  Negative for abdominal pain, nausea and vomiting.   Musculoskeletal:  Positive for joint swelling (swelling to right posterior elbow.).   Neurological:  Negative for weakness and headaches.       Objective:   BP (!) 140/82 (BP Location: Right arm, Patient Position: Sitting, BP Method: Large (Manual))   Pulse 75   Ht 6' (1.829 m)   Wt 128.2 kg (282 lb 10.1 oz)   SpO2 98%   BMI 38.33 kg/m²      Physical Exam  Vitals and nursing note reviewed.   Constitutional:       General: He is not in acute distress.     Appearance: Normal appearance. He is  not ill-appearing.   Eyes:      General:         Right eye: No discharge.         Left eye: No discharge.      Conjunctiva/sclera: Conjunctivae normal.   Pulmonary:      Effort: Pulmonary effort is normal. No respiratory distress.      Breath sounds: Normal breath sounds.   Musculoskeletal:         General: Swelling (to right posterior elbow.) present. No tenderness (negative to palpation of right elbow.) or deformity.      Comments: Normal ROM in right shoulder, elbow and hands 5/5 strength in triceps and biceps.   Neurological:      Mental Status: He is alert.   Psychiatric:         Behavior: Behavior normal.         Assessment & Plan   1. Olecranon bursitis of right elbow  -discussed conservative management with avoiding pressure placement, elbow sleeve if he deems necessary, and close monitoring for any worsening symptoms, including but not limited to redness of elbow, warmth or development of pain.     Follow up if symptoms worsen or fail to improve.    Disclaimer:  This note may have been prepared using voice recognition software, it may have not been extensively proofed, as such there could be errors within the text such as sound alike errors.

## 2023-11-07 ENCOUNTER — TELEPHONE (OUTPATIENT)
Dept: PHARMACY | Facility: HOSPITAL | Age: 73
End: 2023-11-07
Payer: MEDICARE

## 2023-11-07 NOTE — TELEPHONE ENCOUNTER
Population Health: Outreach by Ambulatory Pharmacy Team    Payor: [employer] [facility]  Reason: Adherence  Medication: atorvastatin 80mg  Outcome: Patient Reached: Barriers Identified, patients spouse reports they believe the medication was discontinued encouraged to discuss with pcp at follow up    Maksim Hernandez, AdeliaD

## 2023-11-17 DIAGNOSIS — E11.59 TYPE 2 DIABETES MELLITUS WITH OTHER CIRCULATORY COMPLICATIONS (MULTI): ICD-10-CM

## 2023-11-17 DIAGNOSIS — I15.2 HYPERTENSION SECONDARY TO ENDOCRINE DISORDERS: ICD-10-CM

## 2023-11-17 RX ORDER — LOSARTAN POTASSIUM AND HYDROCHLOROTHIAZIDE 25; 100 MG/1; MG/1
1 TABLET ORAL
Qty: 90 TABLET | Refills: 3 | Status: SHIPPED | OUTPATIENT
Start: 2023-11-17

## 2023-12-08 ENCOUNTER — OFFICE VISIT (OUTPATIENT)
Dept: PRIMARY CARE | Facility: CLINIC | Age: 73
End: 2023-12-08
Payer: MEDICARE

## 2023-12-08 VITALS
OXYGEN SATURATION: 97 % | HEART RATE: 91 BPM | BODY MASS INDEX: 32.07 KG/M2 | TEMPERATURE: 97.1 F | DIASTOLIC BLOOD PRESSURE: 66 MMHG | WEIGHT: 224 LBS | HEIGHT: 70 IN | SYSTOLIC BLOOD PRESSURE: 107 MMHG

## 2023-12-08 DIAGNOSIS — E11.610 TYPE 2 DIABETES MELLITUS WITH DIABETIC NEUROPATHIC ARTHROPATHY, WITHOUT LONG-TERM CURRENT USE OF INSULIN (MULTI): Primary | ICD-10-CM

## 2023-12-08 DIAGNOSIS — Z23 NEED FOR INFLUENZA VACCINATION: ICD-10-CM

## 2023-12-08 DIAGNOSIS — E78.5 HYPERLIPIDEMIA ASSOCIATED WITH TYPE 2 DIABETES MELLITUS (MULTI): ICD-10-CM

## 2023-12-08 DIAGNOSIS — E11.69 HYPERLIPIDEMIA ASSOCIATED WITH TYPE 2 DIABETES MELLITUS (MULTI): ICD-10-CM

## 2023-12-08 DIAGNOSIS — E11.59 HYPERTENSION ASSOCIATED WITH DIABETES (MULTI): ICD-10-CM

## 2023-12-08 DIAGNOSIS — I15.2 HYPERTENSION ASSOCIATED WITH DIABETES (MULTI): ICD-10-CM

## 2023-12-08 DIAGNOSIS — N18.31 STAGE 3A CHRONIC KIDNEY DISEASE (MULTI): ICD-10-CM

## 2023-12-08 PROBLEM — Z13.820 SCREENING FOR OSTEOPOROSIS: Status: RESOLVED | Noted: 2023-08-08 | Resolved: 2023-12-08

## 2023-12-08 PROBLEM — F32.A DEPRESSIVE DISORDER: Status: RESOLVED | Noted: 2023-09-05 | Resolved: 2023-12-08

## 2023-12-08 PROBLEM — E66.9 OBESITY, UNSPECIFIED: Status: RESOLVED | Noted: 2023-03-14 | Resolved: 2023-12-08

## 2023-12-08 LAB
NON-UH HIE A/G RATIO: 1.4
NON-UH HIE ALB: 4.4 G/DL (ref 3.4–5)
NON-UH HIE ALK PHOS: 51 UNIT/L (ref 45–117)
NON-UH HIE BILIRUBIN, TOTAL: 1.1 MG/DL (ref 0.3–1.2)
NON-UH HIE BUN/CREAT RATIO: 15.4
NON-UH HIE BUN: 20 MG/DL (ref 9–23)
NON-UH HIE CALCIUM: 9.8 MG/DL (ref 8.7–10.4)
NON-UH HIE CALCULATED LDL CHOLESTEROL: 53 MG/DL (ref 60–130)
NON-UH HIE CALCULATED OSMOLALITY: 283 MOSM/KG (ref 275–295)
NON-UH HIE CHLORIDE: 98 MMOL/L (ref 98–107)
NON-UH HIE CHOLESTEROL: 128 MG/DL (ref 100–200)
NON-UH HIE CO2, VENOUS: 31 MMOL/L (ref 20–31)
NON-UH HIE CREATININE, URINE MG/DL: 168.5 MG/DL
NON-UH HIE CREATININE: 1.3 MG/DL (ref 0.6–1.1)
NON-UH HIE GFR AA: >60
NON-UH HIE GLOBULIN: 3.1 G/DL
NON-UH HIE GLOMERULAR FILTRATION RATE: 54 ML/MIN/1.73M?
NON-UH HIE GLUCOSE: 143 MG/DL (ref 74–106)
NON-UH HIE GOT: 37 UNIT/L (ref 15–37)
NON-UH HIE GPT: 45 UNIT/L (ref 10–49)
NON-UH HIE HDL CHOLESTEROL: 40 MG/DL (ref 40–60)
NON-UH HIE HGB A1C: 6.5 %
NON-UH HIE K: 3.4 MMOL/L (ref 3.5–5.1)
NON-UH HIE MICROALBUMIN, URINE MG/L: 14 MG/L
NON-UH HIE MICROALBUMIN/CREATININE RATIO: 8 MG MALB/GM CREAT (ref 0–30)
NON-UH HIE NA: 139 MMOL/L (ref 135–145)
NON-UH HIE TOTAL CHOL/HDL CHOL RATIO: 3.2
NON-UH HIE TOTAL PROTEIN: 7.5 G/DL (ref 5.7–8.2)
NON-UH HIE TRIGLYCERIDES: 173 MG/DL (ref 30–150)
NON-UH HIE TSH: 2.98 UIU/ML (ref 0.55–4.78)

## 2023-12-08 PROCEDURE — 1159F MED LIST DOCD IN RCRD: CPT | Performed by: INTERNAL MEDICINE

## 2023-12-08 PROCEDURE — 3044F HG A1C LEVEL LT 7.0%: CPT | Performed by: INTERNAL MEDICINE

## 2023-12-08 PROCEDURE — G0008 ADMIN INFLUENZA VIRUS VAC: HCPCS | Performed by: INTERNAL MEDICINE

## 2023-12-08 PROCEDURE — 3078F DIAST BP <80 MM HG: CPT | Performed by: INTERNAL MEDICINE

## 2023-12-08 PROCEDURE — 3008F BODY MASS INDEX DOCD: CPT | Performed by: INTERNAL MEDICINE

## 2023-12-08 PROCEDURE — 3074F SYST BP LT 130 MM HG: CPT | Performed by: INTERNAL MEDICINE

## 2023-12-08 PROCEDURE — 1160F RVW MEDS BY RX/DR IN RCRD: CPT | Performed by: INTERNAL MEDICINE

## 2023-12-08 PROCEDURE — 99214 OFFICE O/P EST MOD 30 MIN: CPT | Performed by: INTERNAL MEDICINE

## 2023-12-08 PROCEDURE — 1036F TOBACCO NON-USER: CPT | Performed by: INTERNAL MEDICINE

## 2023-12-08 PROCEDURE — 90662 IIV NO PRSV INCREASED AG IM: CPT | Performed by: INTERNAL MEDICINE

## 2023-12-08 NOTE — PROGRESS NOTES
Subjective   Patient ID: Mariano Jolley is a 73 y.o. male who presents for Follow-up (4 month ).    Assessment/Plan     Problem List Items Addressed This Visit       Hyperlipidemia associated with type 2 diabetes mellitus (CMS/Formerly [facility])    Relevant Orders    Comprehensive Metabolic Panel    Hemoglobin A1C    Lipid Panel    TSH with reflex to Free T4 if abnormal    Hypertension associated with diabetes (CMS/Formerly [facility])    Relevant Orders    Comprehensive Metabolic Panel    Hemoglobin A1C    Lipid Panel    TSH with reflex to Free T4 if abnormal    Albumin , Urine Random    Stage 3a chronic kidney disease (CMS/Formerly [facility])    Relevant Orders    Comprehensive Metabolic Panel    Hemoglobin A1C    Lipid Panel    TSH with reflex to Free T4 if abnormal    Type 2 diabetes mellitus with diabetic arthropathy, without long-term current use of insulin (CMS/Formerly [facility]) - Primary    Need for influenza vaccination    Relevant Orders    Flu vaccine, quadrivalent, high-dose, preservative free, age 65y+ (FLUZONE) (Completed)    Comprehensive Metabolic Panel    Hemoglobin A1C    Lipid Panel    TSH with reflex to Free T4 if abnormal     Patient was evaluated today, problem list was reviewed, problems and concerns addressed, Rx list reviewed and updated, lab and tests were noted and reviewed. Life style changes were discussed, always it works better if we eat plant based diet and plenty of fibres and roughage. Consume adequate amount of water and avoid alcohol, light to moderate physical activities and stress reduction are always beneficial for ongoing physical well being. Do not forget to have 6 to 7 hours of sleep regularly and avoid late night ilya screen exposure.  Hemoglobin A1c 6.4  HPI  This is a 73-year-old patient have metabolic syndrome diabetes hypertension hyperlipidemia proteinuria negative for hypoglycemia hypotension or fall    Negative for febrile illness    Negative for COVID    Obesity BMI 32.4 advised sleep apnea test endocrine workup diet and  exercise    Hypertension amlodipine 10 mg a day watch for edema    Hyperlipidemia Lipitor 80 mg a day check CPK LFT lipid every 6 months    Diabetes continue Synjardy thousand/12 point 5 in the morning watch for lactic acidosis check urine spot microalbuminuria hemoglobin A1c twice a year    Hypothyroidism continue Synthroid 25 mcg a day check TSH twice a year    Proteinuria CKD continue Hyzaar 100/25 1 in the morning check potassium uric acid and BMP twice a year    Anxiety depression check PHQ twice a year Paxil 20 mg a day: PHQ less than 5    8 age of 73 advised flu pneumonia COVID-19 vaccines calcium score for the heart colonoscopy and follow-up  Past Medical History:   Diagnosis Date    Encounter for screening for malignant neoplasm of colon 11/17/2016    Screen for colon cancer    Encounter for screening for malignant neoplasm of rectum 11/17/2016    Screening for rectal cancer    Erectile dysfunction due to arterial insufficiency 12/01/2016    Erectile dysfunction due to arterial insufficiency    Essential (primary) hypertension 11/30/2021    Benign essential hypertension    Generalized anxiety disorder 04/04/2022    MARCIA (generalized anxiety disorder)    Impaired fasting glucose 09/27/2021    Fasting hyperglycemia    Obesity, unspecified 04/04/2022    Obesity (BMI 30.0-34.9)    Other hemorrhoids 11/23/2021    Internal hemorrhoid    Pain in right shoulder 10/07/2015    Right shoulder pain    Personal history of diseases of the blood and blood-forming organs and certain disorders involving the immune mechanism 11/17/2016    History of anemia    Personal history of diseases of the skin and subcutaneous tissue 09/16/2015    History of eczema    Personal history of diseases of the skin and subcutaneous tissue 09/16/2015    History of dermatitis    Personal history of diseases of the skin and subcutaneous tissue 04/26/2016    History of allergic contact dermatitis    Personal history of diseases of the skin and  subcutaneous tissue     History of contact dermatitis    Personal history of other benign neoplasm 02/26/2021    History of other benign neoplasm    Personal history of other diseases of male genital organs 09/27/2021    History of benign prostatic hyperplasia    Personal history of other diseases of the circulatory system 12/29/2017    History of hypertension    Personal history of other diseases of the digestive system 03/04/2019    History of gastroesophageal reflux (GERD)    Personal history of other diseases of the digestive system 10/05/2020    History of hemorrhoids    Personal history of other diseases of the musculoskeletal system and connective tissue 07/14/2017    History of arthritis    Personal history of other diseases of the musculoskeletal system and connective tissue 03/04/2019    History of ganglion cyst    Personal history of other diseases of the nervous system and sense organs 07/27/2020    History of otitis media    Personal history of other diseases of the respiratory system 04/24/2014    History of sinusitis    Personal history of other diseases of the respiratory system 11/30/2015    History of sore throat    Personal history of other diseases of the respiratory system 12/11/2014    History of bronchitis    Personal history of other diseases of urinary system 10/05/2020    History of hematuria    Personal history of other endocrine, nutritional and metabolic disease 11/23/2021    History of hyperlipidemia    Personal history of other mental and behavioral disorders 03/04/2019    History of anxiety    Personal history of other mental and behavioral disorders 10/01/2018    History of depression    Personal history of pneumonia (recurrent) 03/04/2019    History of pneumonia    Personal history of pneumonia (recurrent) 12/29/2017    History of community acquired pneumonia    Personal history of urinary (tract) infections 01/18/2022    History of urinary tract infection    Rash and other  nonspecific skin eruption 04/04/2022    Skin rash     Past Surgical History:   Procedure Laterality Date    HEMORRHOID SURGERY       No Known Allergies  Current Outpatient Medications   Medication Sig Dispense Refill    amLODIPine (Norvasc) 10 mg tablet TAKE 1 TABLET BY MOUTH EVERY DAY 90 tablet 3    atorvastatin (Lipitor) 80 mg tablet Take by mouth.      empagliflozin-metformin (Synjardy) 12.5-1,000 mg Take 1 tablet by mouth once daily. 90 tablet 3    levothyroxine (Synthroid, Levoxyl) 25 mcg tablet TAKE 1/2 TABLET BY MOUTH IN THE MORNING      losartan-hydrochlorothiazide (Hyzaar) 100-25 mg tablet TAKE 1 TABLET DAILY IN THE MORNING 90 tablet 3    PARoxetine (Paxil) 20 mg tablet TAKE 1 TABLET BY MOUTH EVERY DAY 90 tablet 3     No current facility-administered medications for this visit.     Family History   Problem Relation Name Age of Onset    Other (Other) Mother      Hypertension Mother      Diabetes Mother      Lung cancer Mother      Hypertension Father      Hyperlipidemia Father      Lung cancer Father      Other (bladder cancer) Father      Other (smoker) Father       Social History     Socioeconomic History    Marital status:      Spouse name: None    Number of children: None    Years of education: None    Highest education level: None   Occupational History    None   Tobacco Use    Smoking status: Never    Smokeless tobacco: Never   Substance and Sexual Activity    Alcohol use: Never    Drug use: Never    Sexual activity: None   Other Topics Concern    None   Social History Narrative    None     Social Determinants of Health     Financial Resource Strain: Not on file   Food Insecurity: Not on file   Transportation Needs: Not on file   Physical Activity: Not on file   Stress: Not on file   Social Connections: Not on file   Intimate Partner Violence: Not on file   Housing Stability: Not on file     Immunization History   Administered Date(s) Administered    Flu vaccine, quadrivalent, high-dose,  "preservative free, age 65y+ (FLUZONE) 12/08/2023    Influenza, Unspecified 10/10/2022    Influenza, seasonal, injectable 08/24/2011, 08/27/2012, 09/05/2013, 09/16/2015, 10/26/2016, 09/08/2017, 10/10/2022    Influenza, seasonal, injectable, preservative free 09/16/2015    Pfizer COVID-19 vaccine, bivalent, age 12 years and older (30 mcg/0.3 mL) 10/18/2022    Pfizer Gray Cap SARS-CoV-2 04/05/2022    Pfizer Purple Cap SARS-CoV-2 02/27/2021, 03/27/2021, 10/11/2021    Pneumococcal conjugate vaccine, 13-valent (PREVNAR 13) 07/14/2017    Pneumococcal polysaccharide vaccine, 23-valent, age 2 years and older (PNEUMOVAX 23) 09/30/2019    SARS-CoV-2, Unspecified 04/05/2022    Zoster vaccine, recombinant, adult (SHINGRIX) 06/29/2019, 07/10/2020       Review of Systems  Review of systems is otherwise negative unless stated above or in history of present illness.    Objective   Visit Vitals  /66 (BP Location: Left arm, Patient Position: Sitting, BP Cuff Size: Large adult)   Pulse 91   Temp 36.2 °C (97.1 °F)   Ht 1.778 m (5' 10\")   Wt 102 kg (224 lb)   SpO2 97%   BMI 32.14 kg/m²   Smoking Status Never   BSA 2.24 m²     Physical Exam  Constitutional: BMI 32   General: not in acute distress.   HENT:      Head: Normocephalic and atraumatic.      Nose: Nose normal.   Eyes: Eyeglasses     Extraocular Movements: Extraocular movements intact.      Conjunctiva/sclera: Conjunctivae normal.   Cardiovascular:      Rate and Rhythm: Normal rate ,  No M/R/G  Pulmonary:      Effort: Pulmonary effort is normal.      Breath sounds: Normal, Bilat Equal AE  Skin:     General: Skin is warm.   Neurological: Diabetic neuropathy     Mental Status: He is alert and oriented to person, place, and time.   Psychiatric:    Anxiety depression PHQ less than 5     Mood and Affect: Mood normal.         Behavior: Behavior normal.   Musculoskeletal   FROM in all extremitirs,  Joint-no swelling or tenderness    No visits with results within 4 Month(s) from " this visit.   Latest known visit with results is:   Lab on 08/08/2023   Component Date Value Ref Range Status    ALBUMIN (MG/L) IN URINE 08/08/2023 27.7  Not Established mg/L Final    Albumin/Creatine Ratio 08/08/2023 11.2  0.0 - 30.0 ug/mg crt Final    Creatinine, Urine 08/08/2023 247.0  20.0 - 370.0 mg/dL Final    Hemoglobin A1C 08/08/2023 6.4 (A)  % Final    Estimated Average Glucose 08/08/2023 137  MG/DL Final       Radiology: Reviewed imaging in powerchart.  No results found.      Charting was completed using voice recognition technology and may include unintended errors.

## 2023-12-11 ENCOUNTER — TELEPHONE (OUTPATIENT)
Dept: PRIMARY CARE | Facility: CLINIC | Age: 73
End: 2023-12-11
Payer: MEDICARE

## 2024-02-08 DIAGNOSIS — E11.618 TYPE 2 DIABETES MELLITUS WITH OTHER DIABETIC ARTHROPATHY, WITHOUT LONG-TERM CURRENT USE OF INSULIN (MULTI): ICD-10-CM

## 2024-02-08 RX ORDER — EMPAGLIFLOZIN AND METFORMIN HYDROCHLORIDE 12.5; 1 MG/1; MG/1
1 TABLET ORAL DAILY
Qty: 90 TABLET | Refills: 3 | Status: SHIPPED | OUTPATIENT
Start: 2024-02-08

## 2024-02-13 DIAGNOSIS — E11.69 HYPERLIPIDEMIA ASSOCIATED WITH TYPE 2 DIABETES MELLITUS (MULTI): ICD-10-CM

## 2024-02-13 DIAGNOSIS — E78.5 HYPERLIPIDEMIA ASSOCIATED WITH TYPE 2 DIABETES MELLITUS (MULTI): ICD-10-CM

## 2024-02-14 RX ORDER — ATORVASTATIN CALCIUM 80 MG/1
80 TABLET, FILM COATED ORAL DAILY
Qty: 90 TABLET | Refills: 3 | Status: SHIPPED | OUTPATIENT
Start: 2024-02-14

## 2024-05-10 ENCOUNTER — TELEPHONE (OUTPATIENT)
Dept: PRIMARY CARE | Facility: CLINIC | Age: 74
End: 2024-05-10

## 2024-05-10 ENCOUNTER — OFFICE VISIT (OUTPATIENT)
Dept: PRIMARY CARE | Facility: CLINIC | Age: 74
End: 2024-05-10
Payer: MEDICARE

## 2024-05-10 VITALS
HEIGHT: 70 IN | DIASTOLIC BLOOD PRESSURE: 66 MMHG | OXYGEN SATURATION: 97 % | HEART RATE: 72 BPM | SYSTOLIC BLOOD PRESSURE: 107 MMHG | BODY MASS INDEX: 31.35 KG/M2 | WEIGHT: 219 LBS | TEMPERATURE: 97.2 F

## 2024-05-10 DIAGNOSIS — N18.31 STAGE 3A CHRONIC KIDNEY DISEASE (MULTI): ICD-10-CM

## 2024-05-10 DIAGNOSIS — Z00.00 MEDICARE ANNUAL WELLNESS VISIT, SUBSEQUENT: Primary | ICD-10-CM

## 2024-05-10 DIAGNOSIS — E78.5 HYPERLIPIDEMIA ASSOCIATED WITH TYPE 2 DIABETES MELLITUS (MULTI): ICD-10-CM

## 2024-05-10 DIAGNOSIS — Z00.00 ROUTINE GENERAL MEDICAL EXAMINATION AT HEALTH CARE FACILITY: ICD-10-CM

## 2024-05-10 DIAGNOSIS — E11.610 TYPE 2 DIABETES MELLITUS WITH DIABETIC NEUROPATHIC ARTHROPATHY, WITHOUT LONG-TERM CURRENT USE OF INSULIN (MULTI): ICD-10-CM

## 2024-05-10 DIAGNOSIS — I15.2 HYPERTENSION ASSOCIATED WITH DIABETES (MULTI): ICD-10-CM

## 2024-05-10 DIAGNOSIS — E11.59 HYPERTENSION ASSOCIATED WITH DIABETES (MULTI): ICD-10-CM

## 2024-05-10 DIAGNOSIS — Z00.00 HEALTH CARE MAINTENANCE: ICD-10-CM

## 2024-05-10 DIAGNOSIS — E87.6 LOW POTASSIUM SYNDROME: ICD-10-CM

## 2024-05-10 DIAGNOSIS — E11.69 HYPERLIPIDEMIA ASSOCIATED WITH TYPE 2 DIABETES MELLITUS (MULTI): ICD-10-CM

## 2024-05-10 DIAGNOSIS — N40.0 BENIGN PROSTATIC HYPERPLASIA WITHOUT LOWER URINARY TRACT SYMPTOMS: ICD-10-CM

## 2024-05-10 DIAGNOSIS — F32.5 MAJOR DEPRESSIVE DISORDER IN REMISSION, UNSPECIFIED WHETHER RECURRENT (CMS-HCC): ICD-10-CM

## 2024-05-10 LAB
NON-UH HIE A/G RATIO: 1.3
NON-UH HIE ALB: 4.3 G/DL (ref 3.4–5)
NON-UH HIE ALK PHOS: 54 UNIT/L (ref 45–117)
NON-UH HIE APPEARANCE, U: CLEAR
NON-UH HIE BASO COUNT: 0.05 X1000 (ref 0–0.2)
NON-UH HIE BASOS %: 0.7 %
NON-UH HIE BILIRUBIN, TOTAL: 0.9 MG/DL (ref 0.3–1.2)
NON-UH HIE BILIRUBIN, U: NEGATIVE
NON-UH HIE BLOOD, U: ABNORMAL
NON-UH HIE BUN/CREAT RATIO: 15.8
NON-UH HIE BUN: 19 MG/DL (ref 9–23)
NON-UH HIE CALCIUM: 9.9 MG/DL (ref 8.7–10.4)
NON-UH HIE CALCULATED LDL CHOLESTEROL: 55 MG/DL (ref 60–130)
NON-UH HIE CALCULATED OSMOLALITY: 284 MOSM/KG (ref 275–295)
NON-UH HIE CHLORIDE: 102 MMOL/L (ref 98–107)
NON-UH HIE CHOLESTEROL: 132 MG/DL (ref 100–200)
NON-UH HIE CO2, VENOUS: 29 MMOL/L (ref 20–31)
NON-UH HIE COLOR, U: YELLOW
NON-UH HIE CPK: 153 UNIT/L (ref 46–171)
NON-UH HIE CREATININE, URINE MG/DL: 124.9 MG/DL
NON-UH HIE CREATININE: 1.2 MG/DL (ref 0.6–1.1)
NON-UH HIE DIFF?: NO
NON-UH HIE EOS COUNT: 0.16 X1000 (ref 0–0.5)
NON-UH HIE EOSIN %: 2.3 %
NON-UH HIE GFR AA: >60
NON-UH HIE GLOBULIN: 3.2 G/DL
NON-UH HIE GLOMERULAR FILTRATION RATE: 59 ML/MIN/1.73M?
NON-UH HIE GLUCOSE QUAL, U: ABNORMAL
NON-UH HIE GLUCOSE: 133 MG/DL (ref 74–106)
NON-UH HIE GOT: 24 UNIT/L (ref 15–37)
NON-UH HIE GPT: 31 UNIT/L (ref 10–49)
NON-UH HIE HCT: 42.8 % (ref 41–52)
NON-UH HIE HDL CHOLESTEROL: 40 MG/DL (ref 40–60)
NON-UH HIE HGB A1C: 6.2 %
NON-UH HIE HGB: 14.6 G/DL (ref 13.5–17.5)
NON-UH HIE INSTR WBC: 7.1
NON-UH HIE K: 3.4 MMOL/L (ref 3.5–5.1)
NON-UH HIE KETONES, U: NEGATIVE
NON-UH HIE LEUKOCYTE ESTERASE, U: NEGATIVE
NON-UH HIE LYMPH %: 21.5 %
NON-UH HIE LYMPH COUNT: 1.53 X1000 (ref 1.2–4.8)
NON-UH HIE MAGNESIUM: 1.8 MG/DL (ref 1.6–2.6)
NON-UH HIE MCH: 29.5 PG (ref 27–34)
NON-UH HIE MCHC: 34 G/DL (ref 32–37)
NON-UH HIE MCV: 86.6 FL (ref 80–100)
NON-UH HIE MICROALBUMIN, URINE MG/L: 11 MG/L
NON-UH HIE MICROALBUMIN/CREATININE RATIO: 9 MG MALB/GM CREAT (ref 0–30)
NON-UH HIE MONO %: 7 %
NON-UH HIE MONO COUNT: 0.5 X1000 (ref 0.1–1)
NON-UH HIE MPV: 8.1 FL (ref 7.4–10.4)
NON-UH HIE NA: 140 MMOL/L (ref 135–145)
NON-UH HIE NEUTROPHIL %: 68.5 %
NON-UH HIE NEUTROPHIL COUNT (ANC): 4.9 X1000 (ref 1.4–8.8)
NON-UH HIE NITRITE, U: NEGATIVE
NON-UH HIE NUCLEATED RBC: 0 /100WBC
NON-UH HIE PH, U: 5 (ref 4.5–8)
NON-UH HIE PLATELET: 203 X10 (ref 150–450)
NON-UH HIE PROSTATIC SPECIFIC AG SCREEN: 0.2 NG/ML (ref 0–4)
NON-UH HIE PROTEIN, U: NEGATIVE
NON-UH HIE RBC/HPF, U: 2 #/HPF (ref 0–3)
NON-UH HIE RBC: 4.95 X10 (ref 4.7–6.1)
NON-UH HIE RDW: 15.5 % (ref 11.5–14.5)
NON-UH HIE SPECIFIC GRAVITY, U: 1.02 (ref 1–1.03)
NON-UH HIE SQUAMOUS EPITHELIAL CELLS, U: <1 #/HPF
NON-UH HIE TOTAL CHOL/HDL CHOL RATIO: 3.3
NON-UH HIE TOTAL PROTEIN: 7.5 G/DL (ref 5.7–8.2)
NON-UH HIE TRIGLYCERIDES: 183 MG/DL (ref 30–150)
NON-UH HIE TSH: 3.57 UIU/ML (ref 0.55–4.78)
NON-UH HIE U MICRO: ABNORMAL
NON-UH HIE URIC ACID: 5.2 MG/DL (ref 3.7–9.2)
NON-UH HIE UROBILINOGEN QUAL, U: ABNORMAL
NON-UH HIE WBC/HPF, U: <1 #/HPF (ref 0–5)
NON-UH HIE WBC: 7.1 X10 (ref 4.5–11)

## 2024-05-10 PROCEDURE — G0439 PPPS, SUBSEQ VISIT: HCPCS | Performed by: INTERNAL MEDICINE

## 2024-05-10 PROCEDURE — 1160F RVW MEDS BY RX/DR IN RCRD: CPT | Performed by: INTERNAL MEDICINE

## 2024-05-10 PROCEDURE — 3008F BODY MASS INDEX DOCD: CPT | Performed by: INTERNAL MEDICINE

## 2024-05-10 PROCEDURE — 3078F DIAST BP <80 MM HG: CPT | Performed by: INTERNAL MEDICINE

## 2024-05-10 PROCEDURE — 1157F ADVNC CARE PLAN IN RCRD: CPT | Performed by: INTERNAL MEDICINE

## 2024-05-10 PROCEDURE — 3074F SYST BP LT 130 MM HG: CPT | Performed by: INTERNAL MEDICINE

## 2024-05-10 PROCEDURE — 1170F FXNL STATUS ASSESSED: CPT | Performed by: INTERNAL MEDICINE

## 2024-05-10 PROCEDURE — 1159F MED LIST DOCD IN RCRD: CPT | Performed by: INTERNAL MEDICINE

## 2024-05-10 PROCEDURE — 99497 ADVNCD CARE PLAN 30 MIN: CPT | Performed by: INTERNAL MEDICINE

## 2024-05-10 PROCEDURE — G0444 DEPRESSION SCREEN ANNUAL: HCPCS | Performed by: INTERNAL MEDICINE

## 2024-05-10 PROCEDURE — 1036F TOBACCO NON-USER: CPT | Performed by: INTERNAL MEDICINE

## 2024-05-10 RX ORDER — POTASSIUM CHLORIDE 750 MG/1
10 TABLET, FILM COATED, EXTENDED RELEASE ORAL DAILY
Qty: 3 TABLET | Refills: 0 | Status: SHIPPED | OUTPATIENT
Start: 2024-05-10

## 2024-05-10 RX ORDER — LEVOTHYROXINE SODIUM 25 UG/1
TABLET ORAL
Qty: 45 TABLET | Refills: 3 | Status: SHIPPED | OUTPATIENT
Start: 2024-05-10

## 2024-05-10 ASSESSMENT — PATIENT HEALTH QUESTIONNAIRE - PHQ9
1. LITTLE INTEREST OR PLEASURE IN DOING THINGS: NOT AT ALL
SUM OF ALL RESPONSES TO PHQ9 QUESTIONS 1 AND 2: 0
1. LITTLE INTEREST OR PLEASURE IN DOING THINGS: NOT AT ALL
2. FEELING DOWN, DEPRESSED OR HOPELESS: NOT AT ALL
SUM OF ALL RESPONSES TO PHQ9 QUESTIONS 1 AND 2: 0
2. FEELING DOWN, DEPRESSED OR HOPELESS: NOT AT ALL

## 2024-05-10 ASSESSMENT — ACTIVITIES OF DAILY LIVING (ADL)
TAKING_MEDICATION: INDEPENDENT
MANAGING_FINANCES: INDEPENDENT
DOING_HOUSEWORK: INDEPENDENT
DRESSING: INDEPENDENT
BATHING: INDEPENDENT
GROCERY_SHOPPING: INDEPENDENT

## 2024-05-10 ASSESSMENT — LIFESTYLE VARIABLES
HOW OFTEN DO YOU HAVE SIX OR MORE DRINKS ON ONE OCCASION: NEVER
AUDIT-C TOTAL SCORE: 0
SKIP TO QUESTIONS 9-10: 1
HOW OFTEN DO YOU HAVE A DRINK CONTAINING ALCOHOL: NEVER
HOW MANY STANDARD DRINKS CONTAINING ALCOHOL DO YOU HAVE ON A TYPICAL DAY: PATIENT DOES NOT DRINK

## 2024-05-10 NOTE — PROGRESS NOTES
Subjective   Reason for Visit: Mariano Jolley is an 73 y.o. male here for a Medicare Wellness visit.     Past Medical, Surgical, and Family History reviewed and updated in chart.    Reviewed all medications by prescribing practitioner or clinical pharmacist (such as prescriptions, OTCs, herbal therapies and supplements) and documented in the medical record.    HPI  73 old patient  with children grandchildren    Mother have hypertension    Father have heart disease    Negative for any malignancy    Today came complaint of the tingling numbness both upper lower extremity aggravated by the stress and diabetes relieved with the rest    Negative for fall    Negative for hypoglycemia hypotension    Negative for high-grade fever flu RSV or COVID-19    Negative for DVTP    Negative for anxiety depressive dementia suicide    Extensively reviewed the laboratory medication discussed with the patient's and patient's caregiver that his wife    Sent for the blood test follow-up recommended.    Patient Care Team:  Cyndie Ibarra MD as PCP - General (Internal Medicine)  Cyndie Ibarra MD as PCP - [employer] PCP     Review of Systems Gen.: No weight loss, fatigue, anorexia, insomnia, fever.   Eyes: No vision loss, double vision, drainage, eye pain.   ENT: No pharyngitis, dry mouth.   Cardiac: No chest pain, palpitations, syncope, near syncope.   Pulmonary: No shortness of breath, cough, hemoptysis.   Heme/lymph: No swollen glands, fever, bleeding.   GI: No abdominal pain, change in bowel habits, melena, hematemesis, hematochezia, nausea, vomiting, diarrhea.   : No discharge, dysuria, frequency, urgency, hematuria.   Musculoskeletal: No limb pain, joint pain, joint swelling.   Skin: No rashes.   Psych: No depression, anxiety, suicidality, homicidality.   Review of systems is otherwise negative unless stated above or in history of present illness.      Objective   Vitals:  /66 (BP Location: Left  "arm, Patient Position: Sitting, BP Cuff Size: Large adult)   Pulse 72   Temp 36.2 °C (97.2 °F)   Ht 1.778 m (5' 10\")   Wt 99.3 kg (219 lb)   SpO2 97%   BMI 31.42 kg/m²       Physical Exam  Cardiovascular:      Pulses:           Dorsalis pedis pulses are 2+ on the right side and 2+ on the left side.        Posterior tibial pulses are 2+ on the right side and 2+ on the left side.   Musculoskeletal:      Right foot: No deformity.      Left foot: No deformity.   Feet:      Right foot:      Protective Sensation: 5 sites tested.        Skin integrity: Skin integrity normal.      Toenail Condition: Right toenails are normal.      Left foot:      Protective Sensation: 5 sites tested.        Skin integrity: Skin integrity normal.      Toenail Condition: Left toenails are normal.       Physical Exam: BMI 31    Appearance: Alert, oriented , cooperative,  in no acute distress. Well nourished & well hydrated.    Skin: Intact,  dry skin, no lesions, rash, petechiae or purpura.     Eyes: PERRLA, EOMs intact,  Conjunctiva pink with no redness or exudates. Cornea & anterior chamber are clear, Eyelids without lesions. No scleral icterus.     ENT: Hearing grossly intact. External auditory canals patent, tympanic membranes intact with visible landmarks. Nares patent, mucus membranes moist. Dentition without lesions. Pharynx clear, uvula midline.     Neck: Supple, without meningismus. Thyroid not palpable. Trachea at midline. No lymphadenopathy.    Pulmonary: Basal crackles   cardiac: Normal S1, S2 without murmur, rub, gallop or extrasystole. No JVD, Carotids without bruits.    Abdomen: Soft, nontender, active bowel sounds.  No palpable organomegaly.  No rebound or guarding.  No CVA tenderness.    Genitourinary: Exam deferred.    Musculoskeletal: Full range of motion. no pain, edema, or deformity. Pulses full and equal. No cyanosis, clubbing, or edema.    Neurological: Mild sensory moderate diabetic neuropathy    Psychiatric: " Appropriate mood and affect.       Assessment/Plan   Problem List Items Addressed This Visit       Hyperlipidemia associated with type 2 diabetes mellitus (Multi)    Current Assessment & Plan     Diabetes is chronic disease, it does not get cured but it can be controlled, in modern medicine there are variety of measures taken to control DM. Usually we want to preserve beta cell functions. Please understand the disease and how our life style can affect control of glycemia. Diabetes leads to macro and microvascular complications and they could be devastating. It is important to have annual eye check and frequent foot inspection and foot inspection. Kidney dysfunction including dialysis, foot amputations, neuropathy, foot ulcers and accelerated atherosclerotic vascular disease are known complications of uncontrolled DM, pt was educated and explained.           Relevant Orders    Referral to Ophthalmology    Albumin , Urine Random    CBC and Auto Differential    Comprehensive Metabolic Panel    Hemoglobin A1C    Lipid Panel    Prostate Specific Antigen, Screen    TSH with reflex to Free T4 if abnormal    Uric Acid    Microscopic Only, Urine    Magnesium    CK    Hypertension associated with diabetes (Multi)    Current Assessment & Plan     Patients BP readings reviewed and addressed, as we age our arteries turn stiffer and less elastic. Restricting salt consumption and staying physically fit with regular exercise regimen is the only way to keep our vasculature less tonic. Studies have shown that keeping ideal body wt, exercise routine about 140 to 150 minutes a week, eating variety of plant based diet and drinking plentiful water are quite helpful. Monitor BP twice or once a week at home and bring log to be reviewed by me. Uncontrolled BP has long term consequences including heart failure, myocardial infarction, accelerated atherosclerosis and kidney dysfunction. Therapy reviewed and explained.           Relevant Orders     Referral to Ophthalmology    Albumin , Urine Random    CBC and Auto Differential    Comprehensive Metabolic Panel    Hemoglobin A1C    Lipid Panel    Prostate Specific Antigen, Screen    TSH with reflex to Free T4 if abnormal    Uric Acid    Microscopic Only, Urine    Magnesium    CK    Stage 3a chronic kidney disease (Multi)    Current Assessment & Plan     CKD and various stages of CKD and significance explained, CKD is very common and rising diagnosis among [place] adults. Main culprits for CKD etiology needs to be attended well. GFR values explained. Nephrotoxic agents has to be avoided, plenty of water consumption is always beneficial. Avoid NSAIDs, always check with MD about usage of OTC medications or any other Rx given by other providers. GFR less then 10 usually will need renal replacement therapy. Episodic check on renal functions needed. Always ask MD about GFR at next visit. Avoid dehydration.          Relevant Orders    Referral to Ophthalmology    Albumin , Urine Random    CBC and Auto Differential    Comprehensive Metabolic Panel    Hemoglobin A1C    Lipid Panel    Prostate Specific Antigen, Screen    TSH with reflex to Free T4 if abnormal    Uric Acid    Microscopic Only, Urine    Magnesium    CK    Routine general medical examination at health care facility - Primary    Relevant Medications    levothyroxine (Synthroid, Levoxyl) 25 mcg tablet    Type 2 diabetes mellitus with diabetic arthropathy, without long-term current use of insulin (Multi)    Relevant Orders    Referral to Ophthalmology    Albumin , Urine Random    CBC and Auto Differential    Comprehensive Metabolic Panel    Hemoglobin A1C    Lipid Panel    Prostate Specific Antigen, Screen    TSH with reflex to Free T4 if abnormal    Uric Acid    Microscopic Only, Urine    Magnesium    CK    Adult BMI 31.0-31.9 kg/sq m    Current Assessment & Plan     I spent <15 minutes face to face with individual providing recommendations for nutrition  choices and exercise plan to help achieve weight reduction goals. Obesity is systemic disorder and it can bring devastating morbidities in furture. It is a matter of calorie gain and loss, keeping bodybank in negative calorie balance mode is the way to sustain weight loss.Diet has a big role in reducing excess body wt. Scheduled and well planned meals and food intake with watchfulness and understanding of calorie portion and distribution is key to understand. Bariatric surgery is another option if sustained wt loss is not achieved and faced with one or more comorbidities with morbid obesity. Weigh yourself twice a week to understand and follow wt loss goals.           Relevant Orders    Referral to Ophthalmology    Albumin , Urine Random    CBC and Auto Differential    Comprehensive Metabolic Panel    Hemoglobin A1C    Lipid Panel    Prostate Specific Antigen, Screen    TSH with reflex to Free T4 if abnormal    Uric Acid    Microscopic Only, Urine    Magnesium    CK    Major depressive disorder in remission (CMS-HCC)    Current Assessment & Plan     Depression is chronic and quite common and notorious mental health disorder and it is quite common and widespread, there are several therapeutics available for depression now a days. It is considered as chemical imbalance disorder and with medications , it can be adjusted. There are side effects from SSRI and SNRIs but on long term, they are well tolerated. Please do not feel awkward or shy to contact us if feel tired, sleepy, lack of energy or aloof/ alone. Untreated depression can bring serious consequences including suicidal ideations, mental health counselling is available if need arises. Usually treatment of depression is long lasting therapy with periodic evaluations and follow ups. Pt with depression no longer have to feel frustrated, helpless or isolated.Detailed discussion was carried out.           Relevant Orders    Referral to Ophthalmology    Albumin , Urine  Random    CBC and Auto Differential    Comprehensive Metabolic Panel    Hemoglobin A1C    Lipid Panel    Prostate Specific Antigen, Screen    TSH with reflex to Free T4 if abnormal    Uric Acid    Microscopic Only, Urine    Magnesium    CK    Benign prostatic hyperplasia without lower urinary tract symptoms    Relevant Orders    Prostate Specific Antigen, Screen       I spent 15 minutes obtaining and discussing depression screening using pHq-2 questions with patient documented in the chart treatment plan discussI spent 15 minutes face-to-face    Voluntary discuss with patient about Advance care planning DO NOT RESUSCITATE I  spent more than 18 minutes discussing advanced Planning including an explanation and discussion of advanced directives discussed about a living will and power of  patient was encouraged to work on completing this documentation options are1= DO NOT RESUSCITATE CC2=, DO NOT RESUSCITATE  at arrest,3= full code documented in the chart.

## 2024-05-10 NOTE — TELEPHONE ENCOUNTER
----- Message from Cyndie Ibarra MD sent at 5/10/2024 12:57 PM EDT -----  Hematuria glucosuria triglyceride 183 creatinine 1.2 potassium 3.4 glucose 133 advised low-carb diet low-fat diet low protein diet potassium 10 mg daily #3 repeat BMP 1 week  
----- Message from Cyndie Ibarra MD sent at 5/10/2024 12:57 PM EDT -----  Hematuria glucosuria triglyceride 183 creatinine 1.2 potassium 3.4 glucose 133 advised low-carb diet low-fat diet low protein diet potassium 10 mg daily #3 repeat BMP 1 week  
Patient informed/Warm blanket/Pillow/Darkened room

## 2024-05-10 NOTE — ASSESSMENT & PLAN NOTE
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       1)Anuric DANDRE   ATN   In setting of hypotension, sepsis, E coli bacteremia, peritonitis, and cirrhosis. Ischemic ATN. Started CRRT 5/23 though had lengthy discussion with patient's , given continued high lactate and high pressor needs, overall prognosis remains poor. Will provide time-limited trial of dialysis, but would not recommend chronic dialysis if indicated.   CRRT started 5/23                  - L femoral mary                 - pre-filter phoxillum 4K 1000 ml/h                  - dialysate phoxillum 4K 1000 ml/h                  - post-filter phoxillum 4K 200 ml/h                 - attempting net even today if possible    2) Septic shock due to E coli bacteremia   SBP   Improving, now down to 2 pressors.     3) Severe lactic acidosis   AGMA, improved   Lactate starting to improve, but very slowly. CT pending. Developed some alkalosis, thus switched post-filter solution.     4) CKD II   Solitary kidney   History of R nephrectomy   Baseline Cr 1-1.2 in setting of solitary kidney.        5) Acute liver failure  Cirrhosis due to PBC, decompensated   Coagulopathy   Slowly worsening cirrhosis since diagnosis with CLL. Dr. Cool, primary hepatologist has been concerned that CLL may be affecting her liver since liver decompensation correlating with CLL diagnosis. Pt not a liver transplant candidate currently due to age, referrals to other centers have been placed. Previously when patient was a few years younger, liver transplant eval was pursed preemptively, but patient's health was too good at the time.      6) Leukocytosis, improved   CLL   Follows at [facility].      7) Anemia, worsened   Diabetes is chronic disease, it does not get cured but it can be controlled, in modern medicine there are variety of measures taken to control DM. Usually we want to preserve beta cell functions. Please understand the disease and how our life style can affect control of glycemia. Diabetes leads to macro and microvascular complications and they could be devastating. It is important to have annual eye check and frequent foot inspection and foot inspection. Kidney dysfunction including dialysis, foot amputations, neuropathy, foot ulcers and accelerated atherosclerotic vascular disease are known complications of uncontrolled DM, pt was educated and explained.       Thrombocytopenia, worsened    Chronic issues for patient due to liver disease.     Plan/Recs:  1) continue CRRT   2) attempting net even today   3) changing dialysate to 4K since she is starting to need supplementation   4) switched post-filter to phoxillum due to alkalosis  5) updated  at bedside       Discussed with bedside RNs and ICU team Dr. Orozco.    Caroline Tirado MD   [facility]  Office: [phone]          Interval History:     - no acute events overnight   - weaned off of epinephrine  - lactate down to 8, plan for CT to evaluate for necrotic bowel   - pH alkalotic, changed post-filter to phoxillum   - starting to need k and phos supplementation, will change pre filter and dialysate to phoxillum   - son and  updated at bedside           Medications and Allergies:       hydrocortisone sodium succinate PF  50 mg Intravenous Q6H     pantoprazole  40 mg Per Feeding Tube QAM AC    Or     pantoprazole  40 mg Intravenous QAM AC     piperacillin-tazobactam  4.5 g Intravenous Q6H     sodium chloride (PF)  3 mL Intracatheter Q8H     sodium chloride (PF) 0.9%  10 mL Intracatheter Once in dialysis/CRRT     thyroid  90 mg Oral Daily     [Held by provider] ursodiol  500 mg Oral BID        Allergies   Allergen Reactions     Contrast Dye Hives            Physical Exam:   Vitals were reviewed  /51   Pulse 58   Temp (!) 96.7  F (35.9  C) (Temporal)   Resp 20   Wt 88.9 kg (195 lb 15.8 oz)   SpO2 98%   BMI 37.03 kg/m      Wt Readings from Last 3 Encounters:   05/25/23 88.9 kg (195 lb 15.8 oz)   05/21/23 72.1 kg (159 lb)   05/16/23 68 kg (150 lb)       Intake/Output Summary (Last 24 hours) at 5/24/2023 1527  Last data filed at 5/24/2023 1400  Gross per 24 hour   Intake 6436.99 ml   Output 76 ml   Net 6360.99 ml       GENERAL APPEARANCE: NAD, intubated, sedated   HEENT:  ETT in place   RESP: crackles  CV: RRR  ABDOMEN: distended and firm, worse  EXTREMITIES/SKIN: anasarca, 3-4+  edema in all extremities with weeping  NEURO:  sedated  LINES:  + guerra with drops of dark urine           Data:     BMP  Recent Labs   Lab 05/25/23  1216 05/25/23  1211 05/25/23  0827 05/25/23  0607 05/25/23  0406 05/25/23  0401 05/25/23  0009 05/25/23  0005 05/24/23 2006 05/24/23 2002 05/24/23  1245 05/24/23  1209   NA  --  136  --   --   --  137  --   --   --  137  --  137   POTASSIUM  --  3.2*  --   --   --  3.5  --  3.4  --  3.5  --  3.9   CHLORIDE  --  93*  --   --   --  93*  --   --   --  93*  --  94*   SONYA  --  9.7  --   --   --  9.4  --   --   --  9.2  --  9.0   CO2  --  27  --   --   --  24  --   --   --  21*  --  17*   BUN  --  17.7  --   --   --  20.9  --   --   --  25.1*  --  28.9*   CR  --  1.21*  --   --   --  1.32*  --   --   --  1.53*  --  1.74*   GLC 90 94 95 105*   < > 112*   < >  --    < > 128*   < > 117*    < > = values in this interval not displayed.     CBC  Recent Labs   Lab 05/25/23  1211 05/25/23  1012 05/25/23  0401 05/24/23 2002 05/24/23  1647   WBC 20.0*  --  20.9* 22.5* 22.5*   HGB 7.6* 7.5* 6.7* 7.1* 7.2*   HCT 22.4*  --  19.4* 21.1* 21.5*   MCV 93  --  94 95 96   PLT 33*  --  17* 13* 15*     Lab Results   Component Value Date    AST 49 (H) 05/25/2023    ALT 31 05/25/2023    ALKPHOS 109 (H) 05/25/2023    BILITOTAL 6.2 (H) 05/25/2023    BILICONJ 0.0 06/20/2014     Lab Results   Component Value Date    INR 2.24 (H) 05/25/2023       Attestation:  I have reviewed today's vital signs, notes, medications, labs and imaging.    Caroline Tirado MD  [facility] - Nephrology  Office: [phone]

## 2024-05-10 NOTE — PROGRESS NOTES
Assessment and Plan:  Problem List Items Addressed This Visit       Hyperlipidemia associated with type 2 diabetes mellitus (Multi)    Hypertension associated with diabetes (Multi)    Stage 3a chronic kidney disease (Multi)    Medicare annual wellness visit, subsequent - Primary    Type 2 diabetes mellitus with diabetic arthropathy, without long-term current use of insulin (Multi)    Adult BMI 31.0-31.9 kg/sq m    Major depressive disorder in remission (CMS-HCC)         Chief Complaint:   Annual Medicare Wellness Office Exam/Comprehensive Problem Focused Follow Up and Physical Exam      HPI:        Patient Active Problem List:  Patient Active Problem List   Diagnosis    Hyperlipidemia associated with type 2 diabetes mellitus (Multi)    Hypertension associated with diabetes (Multi)    Stage 3a chronic kidney disease (Multi)    Medicare annual wellness visit, subsequent    Type 2 diabetes mellitus with diabetic arthropathy, without long-term current use of insulin (Multi)    Need for influenza vaccination    Adult BMI 31.0-31.9 kg/sq m    Major depressive disorder in remission (CMS-HCC)          Comprehensive Medical/Surgical/Social/Family History:  Family History   Problem Relation Name Age of Onset    Other (Other) Mother      Hypertension Mother      Diabetes Mother      Lung cancer Mother      Hypertension Father      Hyperlipidemia Father      Lung cancer Father      Other (bladder cancer) Father      Other (smoker) Father         Past Medical History:   Diagnosis Date    Encounter for screening for malignant neoplasm of colon 11/17/2016    Screen for colon cancer    Encounter for screening for malignant neoplasm of rectum 11/17/2016    Screening for rectal cancer    Erectile dysfunction due to arterial insufficiency 12/01/2016    Erectile dysfunction due to arterial insufficiency    Essential (primary) hypertension 11/30/2021    Benign essential hypertension    Generalized anxiety disorder 04/04/2022    MARCIA  (generalized anxiety disorder)    Impaired fasting glucose 09/27/2021    Fasting hyperglycemia    Obesity, unspecified 04/04/2022    Obesity (BMI 30.0-34.9)    Other hemorrhoids 11/23/2021    Internal hemorrhoid    Pain in right shoulder 10/07/2015    Right shoulder pain    Personal history of diseases of the blood and blood-forming organs and certain disorders involving the immune mechanism 11/17/2016    History of anemia    Personal history of diseases of the skin and subcutaneous tissue 09/16/2015    History of eczema    Personal history of diseases of the skin and subcutaneous tissue 09/16/2015    History of dermatitis    Personal history of diseases of the skin and subcutaneous tissue 04/26/2016    History of allergic contact dermatitis    Personal history of diseases of the skin and subcutaneous tissue     History of contact dermatitis    Personal history of other benign neoplasm 02/26/2021    History of other benign neoplasm    Personal history of other diseases of male genital organs 09/27/2021    History of benign prostatic hyperplasia    Personal history of other diseases of the circulatory system 12/29/2017    History of hypertension    Personal history of other diseases of the digestive system 03/04/2019    History of gastroesophageal reflux (GERD)    Personal history of other diseases of the digestive system 10/05/2020    History of hemorrhoids    Personal history of other diseases of the musculoskeletal system and connective tissue 07/14/2017    History of arthritis    Personal history of other diseases of the musculoskeletal system and connective tissue 03/04/2019    History of ganglion cyst    Personal history of other diseases of the nervous system and sense organs 07/27/2020    History of otitis media    Personal history of other diseases of the respiratory system 04/24/2014    History of sinusitis    Personal history of other diseases of the respiratory system 11/30/2015    History of sore throat     Personal history of other diseases of the respiratory system 12/11/2014    History of bronchitis    Personal history of other diseases of urinary system 10/05/2020    History of hematuria    Personal history of other endocrine, nutritional and metabolic disease 11/23/2021    History of hyperlipidemia    Personal history of other mental and behavioral disorders 03/04/2019    History of anxiety    Personal history of other mental and behavioral disorders 10/01/2018    History of depression    Personal history of pneumonia (recurrent) 03/04/2019    History of pneumonia    Personal history of pneumonia (recurrent) 12/29/2017    History of community acquired pneumonia    Personal history of urinary (tract) infections 01/18/2022    History of urinary tract infection    Rash and other nonspecific skin eruption 04/04/2022    Skin rash       Past Surgical History:   Procedure Laterality Date    HEMORRHOID SURGERY         Social History     Socioeconomic History    Marital status:      Spouse name: None    Number of children: None    Years of education: None    Highest education level: None   Occupational History    None   Tobacco Use    Smoking status: Never    Smokeless tobacco: Never   Substance and Sexual Activity    Alcohol use: Never    Drug use: Never    Sexual activity: None   Other Topics Concern    None   Social History Narrative    None     Social Determinants of Health     Financial Resource Strain: Not on file   Food Insecurity: Not on file   Transportation Needs: Not on file   Physical Activity: Not on file   Stress: Not on file   Social Connections: Not on file   Intimate Partner Violence: Not on file   Housing Stability: Not on file       Tobacco/Alcohol/Opioid use, as well as Illicit Drug Use was screened for/reviewed and documented in Social Documentation section of the chart and medication list as appropriate    Allergies and Medications  No Known Allergies  Current Outpatient Medications    Medication Sig Dispense Refill    amLODIPine (Norvasc) 10 mg tablet TAKE 1 TABLET BY MOUTH EVERY DAY 90 tablet 3    atorvastatin (Lipitor) 80 mg tablet Take 1 tablet (80 mg) by mouth once daily. 90 tablet 3    levothyroxine (Synthroid, Levoxyl) 25 mcg tablet TAKE 1/2 TABLET BY MOUTH IN THE MORNING      losartan-hydrochlorothiazide (Hyzaar) 100-25 mg tablet TAKE 1 TABLET DAILY IN THE MORNING 90 tablet 3    PARoxetine (Paxil) 20 mg tablet TAKE 1 TABLET BY MOUTH EVERY DAY 90 tablet 3    Synjardy 12.5-1,000 mg TAKE 1 TABLET BY MOUTH EVERY DAY 90 tablet 3     No current facility-administered medications for this visit.       Medications and Supplements  prescribed by me and other practitioners or clinical pharmacist (such as prescriptions, OTC's, herbal therapies and supplements) were reviewed and documented in the medical record.     Advance directives  Advanced Care Planning (including a Living Will, Healthcare POA, as well as specific end of life choices and/or directives), was discussed for approximately 16 minutes with the patient and/or surrogate, voluntarily, and documented in the Problem List of the medical record.     Cardiac Risk Assessment  Cardiovascular risk was discussed and, if needed, lifestyle modifications recommended, including nutritional choices, exercise, and elimination of habits contributing to risk. We agreed on a plan to reduce the current cardiovascular risk based on above discussion as needed.  Aspirin use/disuse was discussed and documented in the Problem List of the medical record after reviewing the updated guidelines below:    Consider low dose Aspirin ( mg) use if the benefit for cardiovascular disease prevention outweighs risk for bleeding complications.   In general, low dose ASA should be considered:  In patients WITHOUT prior MI/stroke/PAD (primary prevention):   a. Age <60: Use if 10-year cardiovascular disease risk >20%, with discussion of risks and benefits with  "patient  b. Age 60-<70: Use if 10-year cardiovascular disease risk >20% and low bleeding (e.g., gastrointenstinal) risk, with discussion of risks and benefits with patient  c. Age >=70: Do not use    In patients WITH prior MI/stroke/PAD (secondary prevention):   Generally use unless extremely high bleeding (e.g., gastrointenstinal) risk, with discussion of risks and benefits with patient    ROS otherwise negative aside from what was mentioned above in HPI.    Visit Vitals  /66 (BP Location: Left arm, Patient Position: Sitting, BP Cuff Size: Large adult)   Pulse 72   Temp 36.2 °C (97.2 °F)   Ht 1.778 m (5' 10\")   Wt 99.3 kg (219 lb)   SpO2 97%   BMI 31.42 kg/m²   Smoking Status Never   BSA 2.21 m²       Physical Exam      During the course of the visit the patient was educated and counseled about age appropriate screening and preventive services. Completed preventive screenings were documented in the chart and orders were placed for outstanding screenings/procedures as documented in the Assessment and Plan.    Patient Instructions (the written plan) was given to the patient at check out.    Charting was completed using voice recognition technology and may include unintended errors.  "

## 2024-06-18 LAB — HEMOGLOBIN A1C/HEMOGLOBIN TOTAL IN BLOOD EXTERNAL: 6.2 %

## 2024-08-20 DIAGNOSIS — F32.A DEPRESSIVE DISORDER: ICD-10-CM

## 2024-08-20 DIAGNOSIS — E11.59 HYPERTENSION ASSOCIATED WITH DIABETES (MULTI): ICD-10-CM

## 2024-08-20 DIAGNOSIS — I15.2 HYPERTENSION ASSOCIATED WITH DIABETES (MULTI): ICD-10-CM

## 2024-08-20 RX ORDER — AMLODIPINE BESYLATE 10 MG/1
10 TABLET ORAL DAILY
Qty: 90 TABLET | Refills: 3 | Status: SHIPPED | OUTPATIENT
Start: 2024-08-20

## 2024-08-20 RX ORDER — PAROXETINE HYDROCHLORIDE 20 MG/1
20 TABLET, FILM COATED ORAL DAILY
Qty: 90 TABLET | Refills: 3 | Status: SHIPPED | OUTPATIENT
Start: 2024-08-20

## 2024-09-10 ENCOUNTER — APPOINTMENT (OUTPATIENT)
Dept: PRIMARY CARE | Facility: CLINIC | Age: 74
End: 2024-09-10
Payer: MEDICARE

## 2024-09-10 ENCOUNTER — LAB (OUTPATIENT)
Dept: LAB | Facility: LAB | Age: 74
End: 2024-09-10
Payer: MEDICARE

## 2024-09-10 VITALS
HEART RATE: 75 BPM | HEIGHT: 70 IN | DIASTOLIC BLOOD PRESSURE: 66 MMHG | SYSTOLIC BLOOD PRESSURE: 103 MMHG | BODY MASS INDEX: 31.26 KG/M2 | TEMPERATURE: 97.2 F | WEIGHT: 218.4 LBS | OXYGEN SATURATION: 96 %

## 2024-09-10 DIAGNOSIS — E11.69 HYPERLIPIDEMIA ASSOCIATED WITH TYPE 2 DIABETES MELLITUS (MULTI): ICD-10-CM

## 2024-09-10 DIAGNOSIS — E78.5 HYPERLIPIDEMIA ASSOCIATED WITH TYPE 2 DIABETES MELLITUS (MULTI): ICD-10-CM

## 2024-09-10 DIAGNOSIS — N18.31 STAGE 3A CHRONIC KIDNEY DISEASE (MULTI): ICD-10-CM

## 2024-09-10 DIAGNOSIS — I15.2 HYPERTENSION ASSOCIATED WITH DIABETES (MULTI): ICD-10-CM

## 2024-09-10 DIAGNOSIS — Z23 NEED FOR INFLUENZA VACCINATION: ICD-10-CM

## 2024-09-10 DIAGNOSIS — E78.5 HYPERLIPIDEMIA ASSOCIATED WITH TYPE 2 DIABETES MELLITUS (MULTI): Primary | ICD-10-CM

## 2024-09-10 DIAGNOSIS — F32.5 MAJOR DEPRESSIVE DISORDER WITH SINGLE EPISODE, IN FULL REMISSION (CMS-HCC): ICD-10-CM

## 2024-09-10 DIAGNOSIS — E11.59 HYPERTENSION ASSOCIATED WITH DIABETES (MULTI): ICD-10-CM

## 2024-09-10 DIAGNOSIS — Z78.0 ENCOUNTER FOR OSTEOPOROSIS SCREENING IN ASYMPTOMATIC POSTMENOPAUSAL PATIENT: ICD-10-CM

## 2024-09-10 DIAGNOSIS — E03.9 ACQUIRED HYPOTHYROIDISM: ICD-10-CM

## 2024-09-10 DIAGNOSIS — Z13.820 ENCOUNTER FOR OSTEOPOROSIS SCREENING IN ASYMPTOMATIC POSTMENOPAUSAL PATIENT: ICD-10-CM

## 2024-09-10 DIAGNOSIS — E11.69 HYPERLIPIDEMIA ASSOCIATED WITH TYPE 2 DIABETES MELLITUS (MULTI): Primary | ICD-10-CM

## 2024-09-10 LAB
ALBUMIN SERPL BCP-MCNC: 4.9 G/DL (ref 3.4–5)
ALP SERPL-CCNC: 46 U/L (ref 33–136)
ALT SERPL W P-5'-P-CCNC: 38 U/L (ref 10–52)
ANION GAP SERPL CALC-SCNC: 16 MMOL/L (ref 10–20)
AST SERPL W P-5'-P-CCNC: 31 U/L (ref 9–39)
BILIRUB SERPL-MCNC: 1.1 MG/DL (ref 0–1.2)
BUN SERPL-MCNC: 23 MG/DL (ref 6–23)
CALCIUM SERPL-MCNC: 9.7 MG/DL (ref 8.6–10.6)
CHLORIDE SERPL-SCNC: 96 MMOL/L (ref 98–107)
CHOLEST SERPL-MCNC: 127 MG/DL (ref 0–199)
CHOLESTEROL/HDL RATIO: 3.2
CO2 SERPL-SCNC: 27 MMOL/L (ref 21–32)
CREAT SERPL-MCNC: 1.25 MG/DL (ref 0.5–1.3)
CREAT UR-MCNC: 140.1 MG/DL (ref 20–370)
EGFRCR SERPLBLD CKD-EPI 2021: 60 ML/MIN/1.73M*2
EST. AVERAGE GLUCOSE BLD GHB EST-MCNC: 143 MG/DL
GLUCOSE SERPL-MCNC: 173 MG/DL (ref 74–99)
HBA1C MFR BLD: 6.6 %
HDLC SERPL-MCNC: 40.1 MG/DL
LDLC SERPL CALC-MCNC: 55 MG/DL
MICROALBUMIN UR-MCNC: 31.8 MG/L
MICROALBUMIN/CREAT UR: 22.7 UG/MG CREAT
NON HDL CHOLESTEROL: 87 MG/DL (ref 0–149)
POTASSIUM SERPL-SCNC: 3.3 MMOL/L (ref 3.5–5.3)
PROT SERPL-MCNC: 7.7 G/DL (ref 6.4–8.2)
SODIUM SERPL-SCNC: 136 MMOL/L (ref 136–145)
TRIGL SERPL-MCNC: 161 MG/DL (ref 0–149)
VLDL: 32 MG/DL (ref 0–40)

## 2024-09-10 PROCEDURE — 1036F TOBACCO NON-USER: CPT | Performed by: INTERNAL MEDICINE

## 2024-09-10 PROCEDURE — 1157F ADVNC CARE PLAN IN RCRD: CPT | Performed by: INTERNAL MEDICINE

## 2024-09-10 PROCEDURE — 3078F DIAST BP <80 MM HG: CPT | Performed by: INTERNAL MEDICINE

## 2024-09-10 PROCEDURE — G0008 ADMIN INFLUENZA VIRUS VAC: HCPCS | Performed by: INTERNAL MEDICINE

## 2024-09-10 PROCEDURE — 80061 LIPID PANEL: CPT

## 2024-09-10 PROCEDURE — 82570 ASSAY OF URINE CREATININE: CPT

## 2024-09-10 PROCEDURE — 3044F HG A1C LEVEL LT 7.0%: CPT | Performed by: INTERNAL MEDICINE

## 2024-09-10 PROCEDURE — 3008F BODY MASS INDEX DOCD: CPT | Performed by: INTERNAL MEDICINE

## 2024-09-10 PROCEDURE — G2211 COMPLEX E/M VISIT ADD ON: HCPCS | Performed by: INTERNAL MEDICINE

## 2024-09-10 PROCEDURE — 36415 COLL VENOUS BLD VENIPUNCTURE: CPT

## 2024-09-10 PROCEDURE — 1123F ACP DISCUSS/DSCN MKR DOCD: CPT | Performed by: INTERNAL MEDICINE

## 2024-09-10 PROCEDURE — 83036 HEMOGLOBIN GLYCOSYLATED A1C: CPT

## 2024-09-10 PROCEDURE — 80053 COMPREHEN METABOLIC PANEL: CPT

## 2024-09-10 PROCEDURE — 99214 OFFICE O/P EST MOD 30 MIN: CPT | Performed by: INTERNAL MEDICINE

## 2024-09-10 PROCEDURE — 1160F RVW MEDS BY RX/DR IN RCRD: CPT | Performed by: INTERNAL MEDICINE

## 2024-09-10 PROCEDURE — 82043 UR ALBUMIN QUANTITATIVE: CPT

## 2024-09-10 PROCEDURE — 1159F MED LIST DOCD IN RCRD: CPT | Performed by: INTERNAL MEDICINE

## 2024-09-10 PROCEDURE — 3074F SYST BP LT 130 MM HG: CPT | Performed by: INTERNAL MEDICINE

## 2024-09-10 PROCEDURE — 90662 IIV NO PRSV INCREASED AG IM: CPT | Performed by: INTERNAL MEDICINE

## 2024-09-10 NOTE — PROGRESS NOTES
Subjective   Patient ID: Mariano Jolley is a 74 y.o. male who presents for Follow-up (3 month ).    Assessment/Plan     Problem List Items Addressed This Visit       Hyperlipidemia associated with type 2 diabetes mellitus (Multi) - Primary    Relevant Orders    XR DEXA bone density    Hemoglobin A1c    Albumin-Creatinine Ratio, Urine Random    Comprehensive Metabolic Panel    Lipid Panel    Hypertension associated with diabetes (Multi)     Blood pressure stable continue Hyzaar monitor BMP uric acid magnesium twice a year         Relevant Orders    XR DEXA bone density    Hemoglobin A1c    Albumin-Creatinine Ratio, Urine Random    Comprehensive Metabolic Panel    Lipid Panel    Stage 3a chronic kidney disease (Multi)     Check ultrasound of the kidney check BMP discussed about Farxiga         Encounter for osteoporosis screening in asymptomatic postmenopausal patient    Relevant Orders    XR DEXA bone density    Need for influenza vaccination    Relevant Orders    Flu vaccine, trivalent, preservative free, HIGH-DOSE, age 65y+ (Fluzone) (Completed)    Adult BMI 31.0-31.9 kg/sq m     Diet exercise discussed about Ozempic and Adipex         Major depressive disorder with single episode, in full remission (CMS-HCC)     PHQ less than 6 not suicidal         Acquired hypothyroidism     Monitor TSH twice a year          Patient was evaluated today, problem list was reviewed, problems and concerns addressed, Rx list reviewed and updated, lab and tests were noted and reviewed. Life style changes were discussed, always it works better if we eat plant based diet and plenty of fibres and roughage. Consume adequate amount of water and avoid alcohol, light to moderate physical activities and stress reduction are always beneficial for ongoing physical well being. Do not forget to have 6 to 7 hours of sleep regularly and avoid late night ilya screen exposure.    HPI 74-year-old patient who had history of diabetes with complication  hypertension hyperlipidemia proteinuria anxiety depression thyroid dysfunction complaining of the decreased ADL mobility erectile dysfunction onset gradually duration few months progress slowly aggravating factor polypharmacy and endocrine problem    Negative for headache chest pain hematuria and hyperlipidemia followed COVID    Negative for suicide.  Past Medical History:   Diagnosis Date    Encounter for screening for malignant neoplasm of colon 11/17/2016    Screen for colon cancer    Encounter for screening for malignant neoplasm of rectum 11/17/2016    Screening for rectal cancer    Erectile dysfunction due to arterial insufficiency 12/01/2016    Erectile dysfunction due to arterial insufficiency    Essential (primary) hypertension 11/30/2021    Benign essential hypertension    Generalized anxiety disorder 04/04/2022    MARCIA (generalized anxiety disorder)    Impaired fasting glucose 09/27/2021    Fasting hyperglycemia    Obesity, unspecified 04/04/2022    Obesity (BMI 30.0-34.9)    Other hemorrhoids 11/23/2021    Internal hemorrhoid    Pain in right shoulder 10/07/2015    Right shoulder pain    Personal history of diseases of the blood and blood-forming organs and certain disorders involving the immune mechanism 11/17/2016    History of anemia    Personal history of diseases of the skin and subcutaneous tissue 09/16/2015    History of eczema    Personal history of diseases of the skin and subcutaneous tissue 09/16/2015    History of dermatitis    Personal history of diseases of the skin and subcutaneous tissue 04/26/2016    History of allergic contact dermatitis    Personal history of diseases of the skin and subcutaneous tissue     History of contact dermatitis    Personal history of other benign neoplasm 02/26/2021    History of other benign neoplasm    Personal history of other diseases of male genital organs 09/27/2021    History of benign prostatic hyperplasia    Personal history of other diseases of the  circulatory system 12/29/2017    History of hypertension    Personal history of other diseases of the digestive system 03/04/2019    History of gastroesophageal reflux (GERD)    Personal history of other diseases of the digestive system 10/05/2020    History of hemorrhoids    Personal history of other diseases of the musculoskeletal system and connective tissue 07/14/2017    History of arthritis    Personal history of other diseases of the musculoskeletal system and connective tissue 03/04/2019    History of ganglion cyst    Personal history of other diseases of the nervous system and sense organs 07/27/2020    History of otitis media    Personal history of other diseases of the respiratory system 04/24/2014    History of sinusitis    Personal history of other diseases of the respiratory system 11/30/2015    History of sore throat    Personal history of other diseases of the respiratory system 12/11/2014    History of bronchitis    Personal history of other diseases of urinary system 10/05/2020    History of hematuria    Personal history of other endocrine, nutritional and metabolic disease 11/23/2021    History of hyperlipidemia    Personal history of other mental and behavioral disorders 03/04/2019    History of anxiety    Personal history of other mental and behavioral disorders 10/01/2018    History of depression    Personal history of pneumonia (recurrent) 03/04/2019    History of pneumonia    Personal history of pneumonia (recurrent) 12/29/2017    History of community acquired pneumonia    Personal history of urinary (tract) infections 01/18/2022    History of urinary tract infection    Rash and other nonspecific skin eruption 04/04/2022    Skin rash     Past Surgical History:   Procedure Laterality Date    HEMORRHOID SURGERY       No Known Allergies  Current Outpatient Medications   Medication Sig Dispense Refill    amLODIPine (Norvasc) 10 mg tablet TAKE 1 TABLET BY MOUTH EVERY DAY 90 tablet 3    atorvastatin  (Lipitor) 80 mg tablet Take 1 tablet (80 mg) by mouth once daily. 90 tablet 3    levothyroxine (Synthroid, Levoxyl) 25 mcg tablet TAKE 1/2 TABLET BY MOUTH IN THE MORNING 45 tablet 3    losartan-hydrochlorothiazide (Hyzaar) 100-25 mg tablet TAKE 1 TABLET DAILY IN THE MORNING 90 tablet 3    PARoxetine (Paxil) 20 mg tablet TAKE 1 TABLET BY MOUTH EVERY DAY 90 tablet 3    Synjardy 12.5-1,000 mg TAKE 1 TABLET BY MOUTH EVERY DAY 90 tablet 3     No current facility-administered medications for this visit.     Family History   Problem Relation Name Age of Onset    Other (Other) Mother      Hypertension Mother      Diabetes Mother      Lung cancer Mother      Hypertension Father      Hyperlipidemia Father      Lung cancer Father      Other (bladder cancer) Father      Other (smoker) Father       Social History     Socioeconomic History    Marital status:    Tobacco Use    Smoking status: Never    Smokeless tobacco: Never   Substance and Sexual Activity    Alcohol use: Never    Drug use: Never     Immunization History   Administered Date(s) Administered    Flu vaccine, quadrivalent, high-dose, preservative free, age 65y+ (FLUZONE) 12/08/2023    Flu vaccine, trivalent, preservative free, HIGH-DOSE, age 65y+ (Fluzone) 09/10/2024    Flu vaccine, trivalent, preservative free, age 6 months and greater (Fluarix/Fluzone/Flulaval) 09/16/2015    Influenza, Unspecified 10/10/2022    Influenza, seasonal, injectable 08/24/2011, 08/27/2012, 09/05/2013, 09/16/2015, 10/26/2016, 09/08/2017, 10/10/2022    Novel influenza-H1N1-09, preservative-free 12/09/2009    Pfizer COVID-19 vaccine, bivalent, age 12 years and older (30 mcg/0.3 mL) 10/18/2022    Pfizer Gray Cap SARS-CoV-2 04/05/2022    Pfizer Purple Cap SARS-CoV-2 02/27/2021, 03/27/2021, 10/11/2021    Pneumococcal conjugate vaccine, 13-valent (PREVNAR 13) 07/14/2017    Pneumococcal polysaccharide vaccine, 23-valent, age 2 years and older (PNEUMOVAX 23) 09/30/2019    SARS-CoV-2,  "Unspecified 04/05/2022    Zoster vaccine, recombinant, adult (SHINGRIX) 06/29/2019, 07/10/2020       Review of Systems  Review of systems is otherwise negative unless stated above or in history of present illness.    Objective   Visit Vitals  /66   Pulse 75   Temp 36.2 °C (97.2 °F)   Ht 1.778 m (5' 10\")   Wt 99.1 kg (218 lb 6.4 oz)   SpO2 96%   BMI 31.34 kg/m²   Smoking Status Never   BSA 2.21 m²     Physical Exam  Constitutional: BMI 31     General: not in acute distress.   HENT:      Head: Normocephalic and atraumatic.      Nose: Nose normal.   Eyes: No jaundice     Extraocular Movements: Extraocular movements intact.      Conjunctiva/sclera: Conjunctivae normal.   Cardiovascular: S4     Rate and Rhythm: Normal rate ,  No M/R/G  Pulmonary:      Effort: Pulmonary effort is normal.      Breath sounds: Normal, Bilat Equal AE  Skin:     General: Skin is warm.   Neurological: Mixed diabetic neuropathy     Mental Status: He is alert and oriented to person, place, and time.   Psychiatric:    Not suicidal     Mood and Affect: Mood normal.         Behavior: Behavior normal.   Musculoskeletal   FROM in all extremitirs,  Joint-no swelling or tenderness    Orders Only on 06/18/2024   Component Date Value Ref Range Status    Hemoglobin A1C External 05/10/2024 6.2  % Final       Radiology: Reviewed imaging in powerchart.  No results found.      Charting was completed using voice recognition technology and may include unintended errors.       "

## 2024-09-12 ENCOUNTER — TELEPHONE (OUTPATIENT)
Dept: PRIMARY CARE | Facility: CLINIC | Age: 74
End: 2024-09-12
Payer: MEDICARE

## 2024-09-12 DIAGNOSIS — E87.6 LOW SERUM POTASSIUM: ICD-10-CM

## 2024-09-12 RX ORDER — POTASSIUM CHLORIDE 750 MG/1
TABLET, FILM COATED, EXTENDED RELEASE ORAL
Qty: 30 TABLET | Refills: 0 | Status: SHIPPED | OUTPATIENT
Start: 2024-09-12

## 2024-09-12 NOTE — TELEPHONE ENCOUNTER
----- Message from Cyndie Ibarra sent at 9/11/2024 10:10 AM EDT -----  Glucose 173 potassium 3.3 triglyceride 161 hemoglobin A1c 6.6    Potassium supplement K-Dur 10 mEq twice a week #30    Continue current medicine follow-up 3 months

## 2024-10-17 ENCOUNTER — TELEPHONE (OUTPATIENT)
Dept: PRIMARY CARE | Facility: CLINIC | Age: 74
End: 2024-10-17
Payer: MEDICARE

## 2024-10-17 DIAGNOSIS — M62.838 MUSCLE SPASM: ICD-10-CM

## 2024-10-17 NOTE — TELEPHONE ENCOUNTER
Does PT need an appointment or can  call in something for PT's back? Previous x-ray's it was a muscle.      PT was provided meds for muscle spasms.

## 2024-10-18 RX ORDER — CYCLOBENZAPRINE HCL 10 MG
10 TABLET ORAL 2 TIMES DAILY
Qty: 10 TABLET | Refills: 0 | Status: SHIPPED | OUTPATIENT
Start: 2024-10-18

## 2024-10-18 RX ORDER — METHYLPREDNISOLONE 4 MG/1
TABLET ORAL
Qty: 21 TABLET | Refills: 0 | Status: SHIPPED | OUTPATIENT
Start: 2024-10-18 | End: 2024-10-24

## 2024-10-31 DIAGNOSIS — E11.59 TYPE 2 DIABETES MELLITUS WITH OTHER CIRCULATORY COMPLICATIONS: ICD-10-CM

## 2024-10-31 DIAGNOSIS — I15.2 HYPERTENSION SECONDARY TO ENDOCRINE DISORDERS: ICD-10-CM

## 2024-10-31 RX ORDER — LOSARTAN POTASSIUM AND HYDROCHLOROTHIAZIDE 25; 100 MG/1; MG/1
1 TABLET ORAL
Qty: 90 TABLET | Refills: 3 | Status: SHIPPED | OUTPATIENT
Start: 2024-10-31

## 2024-11-18 DIAGNOSIS — E87.6 LOW SERUM POTASSIUM: ICD-10-CM

## 2024-11-18 RX ORDER — POTASSIUM CHLORIDE 750 MG/1
TABLET, FILM COATED, EXTENDED RELEASE ORAL
Qty: 30 TABLET | Refills: 0 | OUTPATIENT
Start: 2024-11-18

## 2025-01-17 ENCOUNTER — APPOINTMENT (OUTPATIENT)
Dept: PRIMARY CARE | Facility: CLINIC | Age: 75
End: 2025-01-17
Payer: MEDICARE

## 2025-01-17 ENCOUNTER — TELEPHONE (OUTPATIENT)
Dept: PRIMARY CARE | Facility: CLINIC | Age: 75
End: 2025-01-17

## 2025-01-17 VITALS
SYSTOLIC BLOOD PRESSURE: 108 MMHG | WEIGHT: 219 LBS | OXYGEN SATURATION: 96 % | BODY MASS INDEX: 31.35 KG/M2 | DIASTOLIC BLOOD PRESSURE: 66 MMHG | HEIGHT: 70 IN | TEMPERATURE: 97.2 F | HEART RATE: 72 BPM

## 2025-01-17 DIAGNOSIS — N40.0 BENIGN PROSTATIC HYPERPLASIA WITHOUT LOWER URINARY TRACT SYMPTOMS: ICD-10-CM

## 2025-01-17 DIAGNOSIS — Z00.00 HEALTH CARE MAINTENANCE: ICD-10-CM

## 2025-01-17 DIAGNOSIS — E11.610 TYPE 2 DIABETES MELLITUS WITH DIABETIC NEUROPATHIC ARTHROPATHY, WITHOUT LONG-TERM CURRENT USE OF INSULIN (MULTI): ICD-10-CM

## 2025-01-17 DIAGNOSIS — E87.6 LOW SERUM POTASSIUM: ICD-10-CM

## 2025-01-17 DIAGNOSIS — F32.5 MAJOR DEPRESSIVE DISORDER WITH SINGLE EPISODE, IN FULL REMISSION (CMS-HCC): ICD-10-CM

## 2025-01-17 DIAGNOSIS — I10 HYPERTENSION, UNSPECIFIED TYPE: ICD-10-CM

## 2025-01-17 DIAGNOSIS — E11.59 HYPERTENSION ASSOCIATED WITH DIABETES (MULTI): ICD-10-CM

## 2025-01-17 DIAGNOSIS — Z00.00 MEDICARE ANNUAL WELLNESS VISIT, SUBSEQUENT: Primary | ICD-10-CM

## 2025-01-17 DIAGNOSIS — N18.31 STAGE 3A CHRONIC KIDNEY DISEASE (MULTI): ICD-10-CM

## 2025-01-17 DIAGNOSIS — I15.2 HYPERTENSION ASSOCIATED WITH DIABETES (MULTI): ICD-10-CM

## 2025-01-17 DIAGNOSIS — E11.69 HYPERLIPIDEMIA ASSOCIATED WITH TYPE 2 DIABETES MELLITUS (MULTI): ICD-10-CM

## 2025-01-17 DIAGNOSIS — E03.9 ACQUIRED HYPOTHYROIDISM: ICD-10-CM

## 2025-01-17 DIAGNOSIS — E78.5 HYPERLIPIDEMIA ASSOCIATED WITH TYPE 2 DIABETES MELLITUS (MULTI): ICD-10-CM

## 2025-01-17 PROBLEM — Z13.820 ENCOUNTER FOR OSTEOPOROSIS SCREENING IN ASYMPTOMATIC POSTMENOPAUSAL PATIENT: Status: RESOLVED | Noted: 2023-08-08 | Resolved: 2025-01-17

## 2025-01-17 PROBLEM — Z23 NEED FOR INFLUENZA VACCINATION: Status: RESOLVED | Noted: 2023-12-08 | Resolved: 2025-01-17

## 2025-01-17 PROBLEM — Z78.0 ENCOUNTER FOR OSTEOPOROSIS SCREENING IN ASYMPTOMATIC POSTMENOPAUSAL PATIENT: Status: RESOLVED | Noted: 2023-08-08 | Resolved: 2025-01-17

## 2025-01-17 LAB
NON-UH HIE A/G RATIO: 1.4
NON-UH HIE ALB: 4.5 G/DL (ref 3.4–5)
NON-UH HIE ALK PHOS: 59 UNIT/L (ref 45–117)
NON-UH HIE BASO COUNT: 0.06 X1000 (ref 0–0.2)
NON-UH HIE BASOS %: 0.7 %
NON-UH HIE BILIRUBIN, TOTAL: 0.9 MG/DL (ref 0.3–1.2)
NON-UH HIE BUN/CREAT RATIO: 11.5
NON-UH HIE BUN: 15 MG/DL (ref 9–23)
NON-UH HIE CALCIUM: 10.1 MG/DL (ref 8.7–10.4)
NON-UH HIE CALCULATED LDL CHOLESTEROL: 55 MG/DL (ref 60–130)
NON-UH HIE CALCULATED OSMOLALITY: 279 MOSM/KG (ref 275–295)
NON-UH HIE CHLORIDE: 101 MMOL/L (ref 98–107)
NON-UH HIE CHOLESTEROL: 137 MG/DL (ref 100–200)
NON-UH HIE CO2, VENOUS: 29 MMOL/L (ref 20–31)
NON-UH HIE CREATININE, URINE MG/DL: 139.3 MG/DL
NON-UH HIE CREATININE: 1.3 MG/DL (ref 0.6–1.1)
NON-UH HIE DIFF?: NO
NON-UH HIE EOS COUNT: 0.18 X1000 (ref 0–0.5)
NON-UH HIE EOSIN %: 2.3 %
NON-UH HIE GFR AA: >60
NON-UH HIE GLOBULIN: 3.2 G/DL
NON-UH HIE GLOMERULAR FILTRATION RATE: 54 ML/MIN/1.73M?
NON-UH HIE GLUCOSE: 138 MG/DL (ref 74–106)
NON-UH HIE GOT: 26 UNIT/L (ref 15–37)
NON-UH HIE GPT: 31 UNIT/L (ref 10–49)
NON-UH HIE HCT: 44.6 % (ref 41–52)
NON-UH HIE HDL CHOLESTEROL: 39 MG/DL (ref 40–60)
NON-UH HIE HGB A1C: 6.6 %
NON-UH HIE HGB: 15.1 G/DL (ref 13.5–17.5)
NON-UH HIE INSTR WBC: 7.6
NON-UH HIE K: 3.4 MMOL/L (ref 3.5–5.1)
NON-UH HIE LYMPH %: 20.9 %
NON-UH HIE LYMPH COUNT: 1.59 X1000 (ref 1.2–4.8)
NON-UH HIE MAGNESIUM: 1.9 MG/DL (ref 1.6–2.6)
NON-UH HIE MCH: 29.6 PG (ref 27–34)
NON-UH HIE MCHC: 33.8 G/DL (ref 32–37)
NON-UH HIE MCV: 87.5 FL (ref 80–100)
NON-UH HIE MICROALBUMIN, URINE MG/L: 10 MG/L
NON-UH HIE MICROALBUMIN/CREATININE RATIO: 7 MG MALB/GM CREAT (ref 0–30)
NON-UH HIE MONO %: 7 %
NON-UH HIE MONO COUNT: 0.54 X1000 (ref 0.1–1)
NON-UH HIE MPV: 7.9 FL (ref 7.4–10.4)
NON-UH HIE NA: 138 MMOL/L (ref 135–145)
NON-UH HIE NEUTROPHIL %: 69 %
NON-UH HIE NEUTROPHIL COUNT (ANC): 5.26 X1000 (ref 1.4–8.8)
NON-UH HIE NUCLEATED RBC: 0 /100WBC
NON-UH HIE PLATELET: 214 X10 (ref 150–450)
NON-UH HIE PROSTATIC SPECIFIC AG SCREEN: 0.2 NG/ML (ref 0–4)
NON-UH HIE RBC: 5.09 X10 (ref 4.7–6.1)
NON-UH HIE RDW: 15.1 % (ref 11.5–14.5)
NON-UH HIE TOTAL CHOL/HDL CHOL RATIO: 3.5
NON-UH HIE TOTAL PROTEIN: 7.7 G/DL (ref 5.7–8.2)
NON-UH HIE TRIGLYCERIDES: 215 MG/DL (ref 30–150)
NON-UH HIE TSH: 3.36 UIU/ML (ref 0.55–4.78)
NON-UH HIE URIC ACID: 5.5 MG/DL (ref 3.7–9.2)
NON-UH HIE WBC: 7.6 X10 (ref 4.5–11)

## 2025-01-17 PROCEDURE — 99497 ADVNCD CARE PLAN 30 MIN: CPT | Performed by: INTERNAL MEDICINE

## 2025-01-17 PROCEDURE — 3008F BODY MASS INDEX DOCD: CPT | Performed by: INTERNAL MEDICINE

## 2025-01-17 PROCEDURE — 1157F ADVNC CARE PLAN IN RCRD: CPT | Performed by: INTERNAL MEDICINE

## 2025-01-17 PROCEDURE — 1160F RVW MEDS BY RX/DR IN RCRD: CPT | Performed by: INTERNAL MEDICINE

## 2025-01-17 PROCEDURE — 1123F ACP DISCUSS/DSCN MKR DOCD: CPT | Performed by: INTERNAL MEDICINE

## 2025-01-17 PROCEDURE — 1170F FXNL STATUS ASSESSED: CPT | Performed by: INTERNAL MEDICINE

## 2025-01-17 PROCEDURE — 1036F TOBACCO NON-USER: CPT | Performed by: INTERNAL MEDICINE

## 2025-01-17 PROCEDURE — 3078F DIAST BP <80 MM HG: CPT | Performed by: INTERNAL MEDICINE

## 2025-01-17 PROCEDURE — 3074F SYST BP LT 130 MM HG: CPT | Performed by: INTERNAL MEDICINE

## 2025-01-17 PROCEDURE — 1159F MED LIST DOCD IN RCRD: CPT | Performed by: INTERNAL MEDICINE

## 2025-01-17 RX ORDER — POTASSIUM CHLORIDE 20 MEQ/1
20 TABLET, EXTENDED RELEASE ORAL DAILY
Qty: 30 TABLET | Refills: 0 | Status: SHIPPED | OUTPATIENT
Start: 2025-01-17

## 2025-01-17 RX ORDER — NEOMYCIN SULFATE, POLYMYXIN B SULFATE, HYDROCORTISONE 3.5; 10000; 1 MG/ML; [USP'U]/ML; MG/ML
3 SOLUTION/ DROPS AURICULAR (OTIC) 3 TIMES DAILY
Qty: 2.25 ML | Refills: 0 | Status: SHIPPED | OUTPATIENT
Start: 2025-01-17 | End: 2025-01-22

## 2025-01-17 ASSESSMENT — PATIENT HEALTH QUESTIONNAIRE - PHQ9
1. LITTLE INTEREST OR PLEASURE IN DOING THINGS: NOT AT ALL
2. FEELING DOWN, DEPRESSED OR HOPELESS: NOT AT ALL
2. FEELING DOWN, DEPRESSED OR HOPELESS: NOT AT ALL
SUM OF ALL RESPONSES TO PHQ9 QUESTIONS 1 AND 2: 0
1. LITTLE INTEREST OR PLEASURE IN DOING THINGS: NOT AT ALL
SUM OF ALL RESPONSES TO PHQ9 QUESTIONS 1 AND 2: 0

## 2025-01-17 ASSESSMENT — ACTIVITIES OF DAILY LIVING (ADL)
MANAGING_FINANCES: INDEPENDENT
TAKING_MEDICATION: INDEPENDENT
DOING_HOUSEWORK: INDEPENDENT
GROCERY_SHOPPING: INDEPENDENT
BATHING: INDEPENDENT
DRESSING: INDEPENDENT

## 2025-01-17 NOTE — PROGRESS NOTES
Assessment and Plan:  Problem List Items Addressed This Visit       Hyperlipidemia associated with type 2 diabetes mellitus (Multi)     Continue Lipitor 80 mg a day monitor CMP CPK lipid twice a year         Relevant Orders    Albumin-Creatinine Ratio, Urine Random    CBC and Auto Differential    Comprehensive Metabolic Panel    Hemoglobin A1C    Lipid Panel    Magnesium    Prostate Specific Antigen, Screen    TSH with reflex to Free T4 if abnormal    Uric Acid    Hypertension associated with diabetes (Multi)     Continue Hyzaar 100/25 a day potassium magnesium supplement amlodipine 10 mg a day check BMP uric acid magnesium twice a year         Relevant Orders    Albumin-Creatinine Ratio, Urine Random    CBC and Auto Differential    Comprehensive Metabolic Panel    Hemoglobin A1C    Lipid Panel    Magnesium    Prostate Specific Antigen, Screen    TSH with reflex to Free T4 if abnormal    Uric Acid    Stage 3a chronic kidney disease (Multi)     Patient will continue losartan plus Jardiance monitor BMP urine twice a year         Medicare annual wellness visit, subsequent - Primary    Relevant Orders    Albumin-Creatinine Ratio, Urine Random    CBC and Auto Differential    Comprehensive Metabolic Panel    Hemoglobin A1C    Lipid Panel    Magnesium    Prostate Specific Antigen, Screen    TSH with reflex to Free T4 if abnormal    Uric Acid    Type 2 diabetes mellitus with diabetic arthropathy, without long-term current use of insulin (Multi)     Continue Synjardy 12.5/thousand 1 a day monitor urine spot microalbuminuria lipid hemoglobin A1c refer to dentist ophthalmologist and podiatrist follow-up twice a year         Relevant Orders    Albumin-Creatinine Ratio, Urine Random    CBC and Auto Differential    Comprehensive Metabolic Panel    Hemoglobin A1C    Lipid Panel    Magnesium    Prostate Specific Antigen, Screen    TSH with reflex to Free T4 if abnormal    Uric Acid    Adult BMI 31.0-31.9 kg/sq m    Relevant Orders     Albumin-Creatinine Ratio, Urine Random    CBC and Auto Differential    Comprehensive Metabolic Panel    Hemoglobin A1C    Lipid Panel    Magnesium    Prostate Specific Antigen, Screen    TSH with reflex to Free T4 if abnormal    Uric Acid    Major depressive disorder with single episode, in full remission (CMS-[facility])     Continue Paxil 20 mg a day PHQ less than 6 monitor PHQ twice a year not suicidal         Benign prostatic hyperplasia without lower urinary tract symptoms    Relevant Orders    Prostate Specific Antigen, Screen    Acquired hypothyroidism     Continue Synthroid 25 mcg a day check TSH twice a year         Relevant Orders    Albumin-Creatinine Ratio, Urine Random    CBC and Auto Differential    Comprehensive Metabolic Panel    Hemoglobin A1C    Lipid Panel    Magnesium    Prostate Specific Antigen, Screen    TSH with reflex to Free T4 if abnormal    Uric Acid     Other Visit Diagnoses       Hypertension, unspecified type        Relevant Orders    CT cardiac scoring wo IV contrast              Chief Complaint:   Annual Medicare Wellness Office Exam/Comprehensive Problem Focused Follow Up and Physical Exam      HPI: This is a 74-year-old patient  with the children    2 children    1 brother 1 sister Brother  from heart disease Sister  from cancer    Mother have lung cancer    Father had lung cancer    Both a smoker    Personal history of diabetes hypertension hyperlipidemia hypothyroidism obesity BPH anxiety depression    Negative for dementia depression or suicide    Negative for fall    Negative for jaundice hematuria rectal bleeding or weight loss    Review laboratory medication DNR status with the patient discussed case patient's wife.  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)    Adult BMI 31.0-31.9 kg/sq m    Major depressive disorder with single episode, in full remission (CMS-HCC)    Benign prostatic hyperplasia without lower urinary tract symptoms    Acquired hypothyroidism          Comprehensive Medical/Surgical/Social/Family History:  Family History   Problem Relation Name Age of Onset    Other (Other) Mother      Hypertension Mother      Diabetes Mother      Lung cancer Mother      Hypertension Father      Hyperlipidemia Father      Lung cancer Father      Other (bladder cancer) Father      Other (smoker) Father         Past Medical History:   Diagnosis Date    Encounter for screening for malignant neoplasm of colon 11/17/2016    Screen for colon cancer    Encounter for screening for malignant neoplasm of rectum 11/17/2016    Screening for rectal cancer    Erectile dysfunction due to arterial insufficiency 12/01/2016    Erectile dysfunction due to arterial insufficiency    Essential (primary) hypertension 11/30/2021    Benign essential hypertension    Generalized anxiety disorder 04/04/2022    MARCIA (generalized anxiety disorder)    Impaired fasting glucose 09/27/2021    Fasting hyperglycemia    Obesity, unspecified 04/04/2022    Obesity (BMI 30.0-34.9)    Other hemorrhoids 11/23/2021    Internal hemorrhoid    Pain in right shoulder 10/07/2015    Right shoulder pain    Personal history of diseases of the blood and blood-forming organs and certain disorders involving the immune mechanism 11/17/2016    History of anemia    Personal history of diseases of the skin and subcutaneous tissue 09/16/2015    History of eczema    Personal history of diseases of the skin and subcutaneous tissue 09/16/2015    History of dermatitis    Personal history of diseases of the skin and subcutaneous tissue 04/26/2016    History of allergic contact  dermatitis    Personal history of diseases of the skin and subcutaneous tissue     History of contact dermatitis    Personal history of other benign neoplasm 02/26/2021    History of other benign neoplasm    Personal history of other diseases of male genital organs 09/27/2021    History of benign prostatic hyperplasia    Personal history of other diseases of the circulatory system 12/29/2017    History of hypertension    Personal history of other diseases of the digestive system 03/04/2019    History of gastroesophageal reflux (GERD)    Personal history of other diseases of the digestive system 10/05/2020    History of hemorrhoids    Personal history of other diseases of the musculoskeletal system and connective tissue 07/14/2017    History of arthritis    Personal history of other diseases of the musculoskeletal system and connective tissue 03/04/2019    History of ganglion cyst    Personal history of other diseases of the nervous system and sense organs 07/27/2020    History of otitis media    Personal history of other diseases of the respiratory system 04/24/2014    History of sinusitis    Personal history of other diseases of the respiratory system 11/30/2015    History of sore throat    Personal history of other diseases of the respiratory system 12/11/2014    History of bronchitis    Personal history of other diseases of urinary system 10/05/2020    History of hematuria    Personal history of other endocrine, nutritional and metabolic disease 11/23/2021    History of hyperlipidemia    Personal history of other mental and behavioral disorders 03/04/2019    History of anxiety    Personal history of other mental and behavioral disorders 10/01/2018    History of depression    Personal history of pneumonia (recurrent) 03/04/2019    History of pneumonia    Personal history of pneumonia (recurrent) 12/29/2017    History of community acquired pneumonia    Personal history of urinary (tract) infections 01/18/2022     History of urinary tract infection    Rash and other nonspecific skin eruption 04/04/2022    Skin rash       Past Surgical History:   Procedure Laterality Date    HEMORRHOID SURGERY         Social History     Socioeconomic History    Marital status:    Tobacco Use    Smoking status: Never    Smokeless tobacco: Never   Substance and Sexual Activity    Alcohol use: Never    Drug use: Never       Tobacco/Alcohol/Opioid use, as well as Illicit Drug Use was screened for/reviewed and documented in Social Documentation section of the chart and medication list as appropriate    Allergies and Medications  No Known Allergies  Current Outpatient Medications   Medication Sig Dispense Refill    amLODIPine (Norvasc) 10 mg tablet TAKE 1 TABLET BY MOUTH EVERY DAY 90 tablet 3    atorvastatin (Lipitor) 80 mg tablet Take 1 tablet (80 mg) by mouth once daily. 90 tablet 3    levothyroxine (Synthroid, Levoxyl) 25 mcg tablet TAKE 1/2 TABLET BY MOUTH IN THE MORNING 45 tablet 3    losartan-hydrochlorothiazide (Hyzaar) 100-25 mg tablet TAKE 1 TABLET BY MOUTH EVERY DAY IN THE MORNING 90 tablet 3    PARoxetine (Paxil) 20 mg tablet TAKE 1 TABLET BY MOUTH EVERY DAY 90 tablet 3    potassium chloride CR (Klor-Con) 10 mEq ER tablet Take 1 tab twice a week Do not crush, chew, or split. 30 tablet 0    Synjardy 12.5-1,000 mg TAKE 1 TABLET BY MOUTH EVERY DAY 90 tablet 3     No current facility-administered medications for this visit.       Medications and Supplements  prescribed by me and other practitioners or clinical pharmacist (such as prescriptions, OTC's, herbal therapies and supplements) were reviewed and documented in the medical record.     Advance directives  Advanced Care Planning (including a Living Will, Healthcare POA, as well as specific end of life choices and/or directives), was discussed for approximately 16 minutes with the patient and/or surrogate, voluntarily, and documented in the Problem List of the medical record.  "    Cardiac Risk Assessment  Cardiovascular risk was discussed and, if needed, lifestyle modifications recommended, including nutritional choices, exercise, and elimination of habits contributing to risk. We agreed on a plan to reduce the current cardiovascular risk based on above discussion as needed.  Aspirin use/disuse was discussed and documented in the Problem List of the medical record after reviewing the updated guidelines below:    Consider low dose Aspirin ( mg) use if the benefit for cardiovascular disease prevention outweighs risk for bleeding complications.   In general, low dose ASA should be considered:  In patients WITHOUT prior MI/stroke/PAD (primary prevention):   a. Age <60: Use if 10-year cardiovascular disease risk >20%, with discussion of risks and benefits with patient  b. Age 60-<70: Use if 10-year cardiovascular disease risk >20% and low bleeding (e.g., gastrointenstinal) risk, with discussion of risks and benefits with patient  c. Age >=70: Do not use    In patients WITH prior MI/stroke/PAD (secondary prevention):   Generally use unless extremely high bleeding (e.g., gastrointenstinal) risk, with discussion of risks and benefits with patient    ROS otherwise negative aside from what was mentioned above in HPI.    Visit Vitals  /66   Pulse 72   Temp 36.2 °C (97.2 °F)   Ht 1.778 m (5' 10\")   Wt 99.3 kg (219 lb)   SpO2 96%   BMI 31.42 kg/m²   Smoking Status Never   BSA 2.21 m²       Physical Exam  Physical Exam:    Appearance: Alert, oriented , cooperative,  in no acute distress. Well nourished & well hydrated.    Skin: Intact,  dry skin, no lesions, rash, petechiae or purpura.     Eyes: PERRLA, EOMs intact,  Conjunctiva pink with no redness or exudates. Cornea & anterior chamber are clear, Eyelids without lesions. No scleral icterus.     ENT: Hearing grossly intact. External auditory canals patent, tympanic membranes intact with visible landmarks. Nares patent, mucus membranes " moist. Dentition without lesions. Pharynx clear, uvula midline.     Neck: Supple, without meningismus. Thyroid not palpable. Trachea at midline. No lymphadenopathy.    Pulmonary: Basal crackles    Cardiac: Heart murmur without bruit.    Abdomen: Soft, nontender, active bowel sounds.  No palpable organomegaly.  No rebound or guarding.  No CVA tenderness.    Genitourinary: Exam deferred.    Musculoskeletal: Arthritis of knee.    Neurological: Mixed diabetic neuropathy without weakness    Psychiatric: Appropriate mood and affect.         During the course of the visit the patient was educated and counseled about age appropriate screening and preventive services. Completed preventive screenings were documented in the chart and orders were placed for outstanding screenings/procedures as documented in the Assessment and Plan.    Patient Instructions (the written plan) was given to the patient at check out.    Charting was completed using voice recognition technology and may include unintended errors.

## 2025-01-17 NOTE — ASSESSMENT & PLAN NOTE
Continue Synjardy 12.5/thousand 1 a day monitor urine spot microalbuminuria lipid hemoglobin A1c refer to dentist ophthalmologist and podiatrist follow-up twice a year

## 2025-01-17 NOTE — ASSESSMENT & PLAN NOTE
Continue Hyzaar 100/25 a day potassium magnesium supplement amlodipine 10 mg a day check BMP uric acid magnesium twice a year

## 2025-01-17 NOTE — TELEPHONE ENCOUNTER
----- Message from Cyndie Ibarra sent at 1/17/2025 12:27 PM EST -----  LDL 55 triglyceride 215 creatinine 1.3 potassium 3.4 glucose 138 hemoglobin A1c 6.6 thyroid normal uric acid magnesium prostate normal  Advised potassium 20 mEq daily #30 repeat BMP 4 weeks

## 2025-01-29 DIAGNOSIS — E11.69 HYPERLIPIDEMIA ASSOCIATED WITH TYPE 2 DIABETES MELLITUS (MULTI): ICD-10-CM

## 2025-01-29 DIAGNOSIS — E78.5 HYPERLIPIDEMIA ASSOCIATED WITH TYPE 2 DIABETES MELLITUS (MULTI): ICD-10-CM

## 2025-01-30 RX ORDER — ATORVASTATIN CALCIUM 80 MG/1
80 TABLET, FILM COATED ORAL DAILY
Qty: 90 TABLET | Refills: 3 | Status: SHIPPED | OUTPATIENT
Start: 2025-01-30

## 2025-02-18 LAB
BUN SERPL-MCNC: 18 MG/DL (ref 7–25)
BUN/CREAT SERPL: ABNORMAL (CALC) (ref 6–22)
CALCIUM SERPL-MCNC: 9.7 MG/DL (ref 8.6–10.3)
CHLORIDE SERPL-SCNC: 99 MMOL/L (ref 98–110)
CO2 SERPL-SCNC: 29 MMOL/L (ref 20–32)
CREAT SERPL-MCNC: 1.19 MG/DL (ref 0.7–1.28)
EGFRCR SERPLBLD CKD-EPI 2021: 64 ML/MIN/1.73M2
GLUCOSE SERPL-MCNC: 181 MG/DL (ref 65–139)
POTASSIUM SERPL-SCNC: 3.8 MMOL/L (ref 3.5–5.3)
SODIUM SERPL-SCNC: 139 MMOL/L (ref 135–146)

## 2025-02-20 ENCOUNTER — TELEPHONE (OUTPATIENT)
Dept: PRIMARY CARE | Facility: CLINIC | Age: 75
End: 2025-02-20
Payer: MEDICARE

## 2025-02-20 DIAGNOSIS — I15.2 HYPERTENSION SECONDARY TO ENDOCRINE DISORDERS: ICD-10-CM

## 2025-02-20 DIAGNOSIS — E11.618 TYPE 2 DIABETES MELLITUS WITH OTHER DIABETIC ARTHROPATHY, WITHOUT LONG-TERM CURRENT USE OF INSULIN: ICD-10-CM

## 2025-02-20 DIAGNOSIS — I15.2 HYPERTENSION ASSOCIATED WITH DIABETES (MULTI): ICD-10-CM

## 2025-02-20 DIAGNOSIS — Z00.00 HEALTH CARE MAINTENANCE: ICD-10-CM

## 2025-02-20 DIAGNOSIS — E11.59 HYPERTENSION ASSOCIATED WITH DIABETES (MULTI): ICD-10-CM

## 2025-02-20 DIAGNOSIS — E11.59 TYPE 2 DIABETES MELLITUS WITH OTHER CIRCULATORY COMPLICATIONS: ICD-10-CM

## 2025-02-20 DIAGNOSIS — E78.5 HYPERLIPIDEMIA ASSOCIATED WITH TYPE 2 DIABETES MELLITUS (MULTI): ICD-10-CM

## 2025-02-20 DIAGNOSIS — F32.A DEPRESSIVE DISORDER: ICD-10-CM

## 2025-02-20 DIAGNOSIS — E11.69 HYPERLIPIDEMIA ASSOCIATED WITH TYPE 2 DIABETES MELLITUS (MULTI): ICD-10-CM

## 2025-02-20 RX ORDER — EMPAGLIFLOZIN AND METFORMIN HYDROCHLORIDE 12.5; 1 MG/1; MG/1
1 TABLET ORAL DAILY
Qty: 90 TABLET | Refills: 3 | Status: SHIPPED | OUTPATIENT
Start: 2025-02-20

## 2025-02-20 RX ORDER — AMLODIPINE BESYLATE 10 MG/1
10 TABLET ORAL DAILY
Qty: 90 TABLET | Refills: 3 | Status: SHIPPED | OUTPATIENT
Start: 2025-02-20

## 2025-02-20 RX ORDER — LOSARTAN POTASSIUM AND HYDROCHLOROTHIAZIDE 25; 100 MG/1; MG/1
1 TABLET ORAL
Qty: 90 TABLET | Refills: 3 | Status: SHIPPED | OUTPATIENT
Start: 2025-02-20

## 2025-02-20 RX ORDER — PAROXETINE HYDROCHLORIDE 20 MG/1
20 TABLET, FILM COATED ORAL DAILY
Qty: 90 TABLET | Refills: 3 | Status: SHIPPED | OUTPATIENT
Start: 2025-02-20

## 2025-02-20 RX ORDER — LEVOTHYROXINE SODIUM 25 UG/1
TABLET ORAL
Qty: 45 TABLET | Refills: 3 | Status: SHIPPED | OUTPATIENT
Start: 2025-02-20

## 2025-02-20 RX ORDER — ATORVASTATIN CALCIUM 80 MG/1
80 TABLET, FILM COATED ORAL DAILY
Qty: 90 TABLET | Refills: 3 | Status: SHIPPED | OUTPATIENT
Start: 2025-02-20

## 2025-02-20 NOTE — TELEPHONE ENCOUNTER
----- Message from Cyndie Ibarra sent at 2/20/2025 10:02 AM EST -----  Glucose 181 advise low-carb diet

## 2025-05-23 ENCOUNTER — APPOINTMENT (OUTPATIENT)
Dept: PRIMARY CARE | Facility: CLINIC | Age: 75
End: 2025-05-23
Payer: MEDICARE

## 2025-05-23 ENCOUNTER — TELEPHONE (OUTPATIENT)
Dept: PRIMARY CARE | Facility: CLINIC | Age: 75
End: 2025-05-23

## 2025-05-23 VITALS
TEMPERATURE: 97.1 F | OXYGEN SATURATION: 97 % | HEIGHT: 70 IN | DIASTOLIC BLOOD PRESSURE: 69 MMHG | SYSTOLIC BLOOD PRESSURE: 112 MMHG | HEART RATE: 94 BPM | WEIGHT: 217 LBS | BODY MASS INDEX: 31.07 KG/M2

## 2025-05-23 DIAGNOSIS — N18.31 STAGE 3A CHRONIC KIDNEY DISEASE (MULTI): ICD-10-CM

## 2025-05-23 DIAGNOSIS — E11.610 TYPE 2 DIABETES MELLITUS WITH DIABETIC NEUROPATHIC ARTHROPATHY, WITHOUT LONG-TERM CURRENT USE OF INSULIN (MULTI): ICD-10-CM

## 2025-05-23 DIAGNOSIS — I15.2 HYPERTENSION ASSOCIATED WITH DIABETES: ICD-10-CM

## 2025-05-23 DIAGNOSIS — E87.6 LOW SERUM POTASSIUM LEVEL: ICD-10-CM

## 2025-05-23 DIAGNOSIS — E78.5 HYPERLIPIDEMIA ASSOCIATED WITH TYPE 2 DIABETES MELLITUS: Primary | ICD-10-CM

## 2025-05-23 DIAGNOSIS — N40.0 BENIGN PROSTATIC HYPERPLASIA WITHOUT LOWER URINARY TRACT SYMPTOMS: ICD-10-CM

## 2025-05-23 DIAGNOSIS — E11.59 HYPERTENSION ASSOCIATED WITH DIABETES: ICD-10-CM

## 2025-05-23 DIAGNOSIS — E11.69 HYPERLIPIDEMIA ASSOCIATED WITH TYPE 2 DIABETES MELLITUS: Primary | ICD-10-CM

## 2025-05-23 DIAGNOSIS — E03.9 ACQUIRED HYPOTHYROIDISM: ICD-10-CM

## 2025-05-23 PROBLEM — Z00.00 MEDICARE ANNUAL WELLNESS VISIT, SUBSEQUENT: Status: RESOLVED | Noted: 2023-08-08 | Resolved: 2025-05-23

## 2025-05-23 PROBLEM — F32.5 MAJOR DEPRESSIVE DISORDER WITH SINGLE EPISODE, IN FULL REMISSION: Status: RESOLVED | Noted: 2024-05-10 | Resolved: 2025-05-23

## 2025-05-23 LAB
NON-UH HIE A/G RATIO: 1.3
NON-UH HIE ALB: 4.6 G/DL (ref 3.4–5)
NON-UH HIE ALK PHOS: 56 UNIT/L (ref 45–117)
NON-UH HIE BILIRUBIN, TOTAL: 0.9 MG/DL (ref 0.3–1.2)
NON-UH HIE BUN/CREAT RATIO: 14.6
NON-UH HIE BUN: 19 MG/DL (ref 9–23)
NON-UH HIE CALCIUM: 10.4 MG/DL (ref 8.7–10.4)
NON-UH HIE CALCULATED LDL CHOLESTEROL: 57 MG/DL (ref 60–130)
NON-UH HIE CALCULATED OSMOLALITY: 284 MOSM/KG (ref 275–295)
NON-UH HIE CHLORIDE: 97 MMOL/L (ref 98–107)
NON-UH HIE CHOLESTEROL: 130 MG/DL (ref 100–200)
NON-UH HIE CO2, VENOUS: 30 MMOL/L (ref 20–31)
NON-UH HIE CPK: 225 UNIT/L (ref 46–171)
NON-UH HIE CREATININE, URINE MG/DL: 151.6 MG/DL
NON-UH HIE CREATININE: 1.3 MG/DL (ref 0.6–1.1)
NON-UH HIE GFR AA: >60
NON-UH HIE GLOBULIN: 3.4 G/DL
NON-UH HIE GLOMERULAR FILTRATION RATE: 54 ML/MIN/1.73M?
NON-UH HIE GLUCOSE: 140 MG/DL (ref 74–106)
NON-UH HIE GOT: 24 UNIT/L (ref 15–37)
NON-UH HIE GPT: 32 UNIT/L (ref 10–49)
NON-UH HIE HDL CHOLESTEROL: 38 MG/DL (ref 40–60)
NON-UH HIE HGB A1C: 6.5 %
NON-UH HIE K: 3 MMOL/L (ref 3.5–5.1)
NON-UH HIE MAGNESIUM: 1.9 MG/DL (ref 1.6–2.6)
NON-UH HIE MICROALBUMIN, URINE MG/L: 6 MG/L
NON-UH HIE MICROALBUMIN/CREATININE RATIO: 4 MG MALB/GM CREAT (ref 0–30)
NON-UH HIE NA: 140 MMOL/L (ref 135–145)
NON-UH HIE TOTAL CHOL/HDL CHOL RATIO: 3.4
NON-UH HIE TOTAL PROTEIN: 8 G/DL (ref 5.7–8.2)
NON-UH HIE TRIGLYCERIDES: 177 MG/DL (ref 30–150)
NON-UH HIE TSH: 2.79 UIU/ML (ref 0.55–4.78)
NON-UH HIE URIC ACID: 6.1 MG/DL (ref 3.7–9.2)

## 2025-05-23 RX ORDER — LOSARTAN POTASSIUM 100 MG/1
100 TABLET ORAL DAILY
Qty: 90 TABLET | Refills: 3 | Status: SHIPPED | OUTPATIENT
Start: 2025-05-23

## 2025-05-23 RX ORDER — POTASSIUM CHLORIDE 750 MG/1
10 TABLET, FILM COATED, EXTENDED RELEASE ORAL DAILY
Qty: 3 TABLET | Refills: 0 | Status: SHIPPED | OUTPATIENT
Start: 2025-05-23

## 2025-05-23 ASSESSMENT — PATIENT HEALTH QUESTIONNAIRE - PHQ9
1. LITTLE INTEREST OR PLEASURE IN DOING THINGS: NOT AT ALL
2. FEELING DOWN, DEPRESSED OR HOPELESS: NOT AT ALL
SUM OF ALL RESPONSES TO PHQ9 QUESTIONS 1 AND 2: 0

## 2025-05-23 NOTE — TELEPHONE ENCOUNTER
----- Message from Cyndie Ibarra sent at 5/23/2025 11:59 AM EDT -----  Creatinine 1.3 glucose 140 potassium 3  triglyceride 177 A1c 6.5 thyroid and magnesium normal    Discontinue Hyzaar replace with the losartan 100 mg a day plus potassium 10 p.o. daily times 3 repeat CPK BMP 1 week drink lots of water 6 to 8 glasses a day  ----- Message -----  From: Kedar Lo - Lab Results In  Sent: 5/23/2025  11:33 AM EDT  To: Cyndie Ibarra MD

## 2025-05-23 NOTE — PROGRESS NOTES
Subjective   Patient ID: Mariano Jolley is a 74 y.o. male who presents for Follow-up (4 month ).    Assessment/Plan     Problem List Items Addressed This Visit       Hyperlipidemia associated with type 2 diabetes mellitus - Primary    Relevant Orders    Albumin-Creatinine Ratio, Urine Random    Comprehensive Metabolic Panel    Hemoglobin A1C    Lipid Panel    Magnesium    Uric Acid    TSH with reflex to Free T4 if abnormal    CK    Hypertension associated with diabetes    Relevant Orders    Albumin-Creatinine Ratio, Urine Random    Comprehensive Metabolic Panel    Hemoglobin A1C    Lipid Panel    Magnesium    Uric Acid    TSH with reflex to Free T4 if abnormal    CK    ECG 12 lead (Clinic Performed) (Completed)    Stage 3a chronic kidney disease (Multi)    Relevant Orders    Albumin-Creatinine Ratio, Urine Random    Comprehensive Metabolic Panel    Hemoglobin A1C    Lipid Panel    Magnesium    Uric Acid    TSH with reflex to Free T4 if abnormal    CK    Type 2 diabetes mellitus with diabetic arthropathy, without long-term current use of insulin (Multi)    Relevant Orders    Albumin-Creatinine Ratio, Urine Random    Comprehensive Metabolic Panel    Hemoglobin A1C    Lipid Panel    Magnesium    Uric Acid    TSH with reflex to Free T4 if abnormal    CK    Adult BMI 31.0-31.9 kg/sq m    Benign prostatic hyperplasia without lower urinary tract symptoms    Acquired hypothyroidism    Relevant Orders    TSH with reflex to Free T4 if abnormal     Patient was evaluated today, problem list was reviewed, problems and concerns addressed, Rx list reviewed and updated, lab and tests were noted and reviewed. Life style changes were discussed, always it works better if we eat plant based diet and plenty of fibres and roughage. Consume adequate amount of water and avoid alcohol, light to moderate physical activities and stress reduction are always beneficial for ongoing physical well being. Do not forget to have 6 to 7 hours of  sleep regularly and avoid late night ilya screen exposure.    HPI 74-year-old patient have a complex medical problem obesity hypertension hyperlipidemia type 2 diabetes accompanied with the wife complaining erectile dysfunction fatigue and tired weakness possible male menopause    Negative for headache chest pain hematuria rectal bleeding hypoxia hypoglycemia or fall    EKG done discussed with the patient    Hypertension amlodipine plus Hyzaar monitor BMP    Hyperlipidemia low-fat diet Lipitor monitor CPK CMP    Diabetes with complication continue send Synjardy monitor Spot urine microalbuminuria lipid hemoglobin A1c ophthalmology evaluation    Hypothyroidism continue levothyroxine monitor TSH    Anxiety depression PHQ 4 continue Paxil 20 mg a day    At age of 74 male skin cancer prostate cancer colon cancer screening flu pneumonia COVID-19 vaccine check for glaucoma and osteoporosis and calcium score on follow-up  Medical History[1]  Surgical History[2]  Allergies[3]  Current Medications[4]  Family History[5]  Social History[6]  Immunization History   Administered Date(s) Administered    COVID-19, mRNA, LNP-S, PF, 30 mcg/0.3 mL dose 10/11/2021    Flu vaccine, quadrivalent, high-dose, preservative free, age 65y+ (FLUZONE) 12/08/2023    Flu vaccine, trivalent, preservative free, HIGH-DOSE, age 65y+ (Fluzone) 09/10/2024    Flu vaccine, trivalent, preservative free, age 6 months and greater (Fluarix/Fluzone/Flulaval) 09/16/2015    Influenza, Unspecified 10/10/2022    Influenza, seasonal, injectable 08/24/2011, 08/27/2012, 09/05/2013, 09/16/2015, 10/26/2016, 09/08/2017, 10/10/2022    Novel influenza-H1N1-09, preservative-free 12/09/2009    Pfizer COVID-19 vaccine, bivalent, age 12 years and older (30 mcg/0.3 mL) 10/18/2022    Pfizer Gray Cap SARS-CoV-2 04/05/2022    Pfizer Purple Cap SARS-CoV-2 02/27/2021, 03/27/2021    Pneumococcal conjugate vaccine, 13-valent (PREVNAR 13) 07/14/2017    Pneumococcal polysaccharide  "vaccine, 23-valent, age 2 years and older (PNEUMOVAX 23) 09/30/2019    SARS-CoV-2, Unspecified 04/05/2022    Zoster vaccine, recombinant, adult (SHINGRIX) 06/29/2019, 07/10/2020       Review of Systems  Review of systems is otherwise negative unless stated above or in history of present illness.    Objective   Visit Vitals  /69 (BP Location: Left arm, Patient Position: Sitting)   Pulse 94   Temp 36.2 °C (97.1 °F)   Ht 1.778 m (5' 10\")   Wt 98.4 kg (217 lb)   SpO2 97%   BMI 31.14 kg/m²   Smoking Status Never   BSA 2.2 m²     Physical Exam  Constitutional: BMI 31 advised to lose weight     General: not in acute distress.   HENT:      Head: Normocephalic and atraumatic.      Nose: Nose normal.   Eyes: No jaundice     Extraocular Movements: Extraocular movements intact.      Conjunctiva/sclera: Conjunctivae normal.   Cardiovascular: Heart murmur     Rate and Rhythm: Normal rate ,  No M/R/G  Pulmonary:      Effort: Pulmonary effort is normal.      Breath sounds: Normal, Bilat Equal AE  Skin:     General: Skin is warm.   Neurological: Mixed diabetic neuropathy     Mental Status: He is alert and oriented to person, place, and time.   Psychiatric:         Mood and Affect: Mood normal.         Behavior: Behavior normal.   Musculoskeletal not in pain  FROM in all extremitirs,  Joint-no swelling or tenderness    Orders Only on 02/18/2025   Component Date Value Ref Range Status    GLUCOSE 02/18/2025 181 (H)  65 - 139 mg/dL Final    UREA NITROGEN (BUN) 02/18/2025 18  7 - 25 mg/dL Final    CREATININE 02/18/2025 1.19  0.70 - 1.28 mg/dL Final    EGFR 02/18/2025 64  > OR = 60 mL/min/1.73m2 Final    BUN/CREATININE RATIO 02/18/2025 SEE NOTE:  6 - 22 (calc) Final    SODIUM 02/18/2025 139  135 - 146 mmol/L Final    POTASSIUM 02/18/2025 3.8  3.5 - 5.3 mmol/L Final    CHLORIDE 02/18/2025 99  98 - 110 mmol/L Final    CARBON DIOXIDE 02/18/2025 29  20 - 32 mmol/L Final    CALCIUM 02/18/2025 9.7  8.6 - 10.3 mg/dL Final       Radiology: " Reviewed imaging in powerchart.  Imaging  No results found.    Cardiology, Vascular, and Other Imaging  ECG 12 lead (Clinic Performed)  Result Date: 5/23/2025  Heart rate 70 CO 16 QTc 40 no ST elevation or depressions Case discussed with patient normal sinus rhythm          Charting was completed using voice recognition technology and may include unintended errors.            [1]   Past Medical History:  Diagnosis Date    Encounter for screening for malignant neoplasm of colon 11/17/2016    Screen for colon cancer    Encounter for screening for malignant neoplasm of rectum 11/17/2016    Screening for rectal cancer    Erectile dysfunction due to arterial insufficiency 12/01/2016    Erectile dysfunction due to arterial insufficiency    Essential (primary) hypertension 11/30/2021    Benign essential hypertension    Generalized anxiety disorder 04/04/2022    MARCIA (generalized anxiety disorder)    Impaired fasting glucose 09/27/2021    Fasting hyperglycemia    Obesity, unspecified 04/04/2022    Obesity (BMI 30.0-34.9)    Other hemorrhoids 11/23/2021    Internal hemorrhoid    Pain in right shoulder 10/07/2015    Right shoulder pain    Personal history of diseases of the blood and blood-forming organs and certain disorders involving the immune mechanism 11/17/2016    History of anemia    Personal history of diseases of the skin and subcutaneous tissue 09/16/2015    History of eczema    Personal history of diseases of the skin and subcutaneous tissue 09/16/2015    History of dermatitis    Personal history of diseases of the skin and subcutaneous tissue 04/26/2016    History of allergic contact dermatitis    Personal history of diseases of the skin and subcutaneous tissue     History of contact dermatitis    Personal history of other benign neoplasm 02/26/2021    History of other benign neoplasm    Personal history of other diseases of male genital organs 09/27/2021    History of benign prostatic hyperplasia    Personal  history of other diseases of the circulatory system 12/29/2017    History of hypertension    Personal history of other diseases of the digestive system 03/04/2019    History of gastroesophageal reflux (GERD)    Personal history of other diseases of the digestive system 10/05/2020    History of hemorrhoids    Personal history of other diseases of the musculoskeletal system and connective tissue 07/14/2017    History of arthritis    Personal history of other diseases of the musculoskeletal system and connective tissue 03/04/2019    History of ganglion cyst    Personal history of other diseases of the nervous system and sense organs 07/27/2020    History of otitis media    Personal history of other diseases of the respiratory system 04/24/2014    History of sinusitis    Personal history of other diseases of the respiratory system 11/30/2015    History of sore throat    Personal history of other diseases of the respiratory system 12/11/2014    History of bronchitis    Personal history of other diseases of urinary system 10/05/2020    History of hematuria    Personal history of other endocrine, nutritional and metabolic disease 11/23/2021    History of hyperlipidemia    Personal history of other mental and behavioral disorders 03/04/2019    History of anxiety    Personal history of other mental and behavioral disorders 10/01/2018    History of depression    Personal history of pneumonia (recurrent) 03/04/2019    History of pneumonia    Personal history of pneumonia (recurrent) 12/29/2017    History of community acquired pneumonia    Personal history of urinary (tract) infections 01/18/2022    History of urinary tract infection    Rash and other nonspecific skin eruption 04/04/2022    Skin rash   [2]   Past Surgical History:  Procedure Laterality Date    HEMORRHOID SURGERY     [3] No Known Allergies  [4]   Current Outpatient Medications   Medication Sig Dispense Refill    amLODIPine (Norvasc) 10 mg tablet Take 1 tablet  (10 mg) by mouth once daily. 90 tablet 3    atorvastatin (Lipitor) 80 mg tablet Take 1 tablet (80 mg) by mouth once daily. 90 tablet 3    empagliflozin-metformin (Synjardy) 12.5-1,000 mg Take 1 tablet by mouth once daily. 90 tablet 3    levothyroxine (Synthroid, Levoxyl) 25 mcg tablet TAKE 1/2 TABLET BY MOUTH IN THE MORNING 45 tablet 3    losartan-hydrochlorothiazide (Hyzaar) 100-25 mg tablet Take 1 tablet by mouth once daily in the morning. Take before meals. 90 tablet 3    PARoxetine (Paxil) 20 mg tablet Take 1 tablet (20 mg) by mouth once daily. 90 tablet 3     No current facility-administered medications for this visit.   [5]   Family History  Problem Relation Name Age of Onset    Other (Other) Mother      Hypertension Mother      Diabetes Mother      Lung cancer Mother      Hypertension Father      Hyperlipidemia Father      Lung cancer Father      Other (bladder cancer) Father      Other (smoker) Father     [6]   Social History  Socioeconomic History    Marital status:    Tobacco Use    Smoking status: Never    Smokeless tobacco: Never   Substance and Sexual Activity    Alcohol use: Never    Drug use: Never

## 2025-05-27 LAB
ANION GAP SERPL CALCULATED.4IONS-SCNC: 11 MMOL/L (CALC) (ref 7–17)
BUN SERPL-MCNC: 12 MG/DL (ref 7–25)
BUN/CREAT SERPL: ABNORMAL (CALC) (ref 6–22)
CALCIUM SERPL-MCNC: 9.3 MG/DL (ref 8.6–10.3)
CHLORIDE SERPL-SCNC: 106 MMOL/L (ref 98–110)
CK SERPL-CCNC: 145 U/L (ref 19–278)
CO2 SERPL-SCNC: 25 MMOL/L (ref 20–32)
CREAT SERPL-MCNC: 1.08 MG/DL (ref 0.7–1.28)
EGFRCR SERPLBLD CKD-EPI 2021: 72 ML/MIN/1.73M2
GLUCOSE SERPL-MCNC: 147 MG/DL (ref 65–99)
POTASSIUM SERPL-SCNC: 4 MMOL/L (ref 3.5–5.3)
SODIUM SERPL-SCNC: 142 MMOL/L (ref 135–146)

## 2025-05-29 ENCOUNTER — TELEPHONE (OUTPATIENT)
Dept: PRIMARY CARE | Facility: CLINIC | Age: 75
End: 2025-05-29
Payer: MEDICARE

## 2025-05-29 NOTE — TELEPHONE ENCOUNTER
----- Message from Cyndie Ibarra sent at 5/28/2025  9:26 AM EDT -----  Glucose 147 advised low-carb diet repeat TSH hemoglobin A1c next visit  ----- Message -----  From: [facility] Results In  Sent: 5/27/2025  10:09 PM EDT  To: Cyndie Ibarra MD

## 2025-06-02 ENCOUNTER — TELEPHONE (OUTPATIENT)
Dept: PRIMARY CARE | Facility: CLINIC | Age: 75
End: 2025-06-02
Payer: MEDICARE

## 2025-06-02 NOTE — TELEPHONE ENCOUNTER
----- Message from Cyndie Ibarra sent at 6/2/2025 10:43 AM EDT -----  DEXA scan shows osteopenia advised Citracal 2 tablet a day OTC  ----- Message -----  From: [facility] - Imaging Results In  Sent: 5/30/2025   4:45 PM EDT  To: Cyndie Ibarra MD

## 2025-08-20 ENCOUNTER — APPOINTMENT (OUTPATIENT)
Dept: RADIOLOGY | Facility: CLINIC | Age: 75
End: 2025-08-20
Payer: MEDICARE

## 2025-08-20 DIAGNOSIS — I10 HYPERTENSION, UNSPECIFIED TYPE: ICD-10-CM

## 2025-08-20 PROCEDURE — 75571 CT HRT W/O DYE W/CA TEST: CPT

## 2025-08-22 ENCOUNTER — RESULTS FOLLOW-UP (OUTPATIENT)
Dept: PRIMARY CARE | Facility: CLINIC | Age: 75
End: 2025-08-22
Payer: MEDICARE

## 2025-09-23 ENCOUNTER — APPOINTMENT (OUTPATIENT)
Dept: PRIMARY CARE | Facility: CLINIC | Age: 75
End: 2025-09-23
Payer: MEDICARE